# Patient Record
Sex: MALE | Race: WHITE | NOT HISPANIC OR LATINO | Employment: FULL TIME | URBAN - METROPOLITAN AREA
[De-identification: names, ages, dates, MRNs, and addresses within clinical notes are randomized per-mention and may not be internally consistent; named-entity substitution may affect disease eponyms.]

---

## 2017-04-05 ENCOUNTER — ALLSCRIPTS OFFICE VISIT (OUTPATIENT)
Dept: OTHER | Facility: OTHER | Age: 22
End: 2017-04-05

## 2017-04-26 ENCOUNTER — ALLSCRIPTS OFFICE VISIT (OUTPATIENT)
Dept: OTHER | Facility: OTHER | Age: 22
End: 2017-04-26

## 2017-04-26 LAB
FLUAV AG SPEC QL IA: NEGATIVE
INFLUENZA B AG (HISTORICAL): NEGATIVE

## 2017-09-20 ENCOUNTER — ALLSCRIPTS OFFICE VISIT (OUTPATIENT)
Dept: OTHER | Facility: OTHER | Age: 22
End: 2017-09-20

## 2017-09-21 ENCOUNTER — ALLSCRIPTS OFFICE VISIT (OUTPATIENT)
Dept: OTHER | Facility: OTHER | Age: 22
End: 2017-09-21

## 2017-10-11 ENCOUNTER — GENERIC CONVERSION - ENCOUNTER (OUTPATIENT)
Dept: OTHER | Facility: OTHER | Age: 22
End: 2017-10-11

## 2017-10-11 DIAGNOSIS — R53.82 CHRONIC FATIGUE: ICD-10-CM

## 2017-10-11 DIAGNOSIS — Z79.899 OTHER LONG TERM (CURRENT) DRUG THERAPY: ICD-10-CM

## 2017-10-11 DIAGNOSIS — M25.50 PAIN IN JOINT: ICD-10-CM

## 2017-11-11 ENCOUNTER — ALLSCRIPTS OFFICE VISIT (OUTPATIENT)
Dept: OTHER | Facility: OTHER | Age: 22
End: 2017-11-11

## 2017-11-12 NOTE — PROGRESS NOTES
Assessment    1  Acute laryngitis (464 00) (J04 0)    Plan  Acute laryngitis    · Amoxicillin 500 MG Oral Tablet; TAKE 1 TABLET 3 TIMES DAILY    Discussion/Summary    Rto prn  Possible side effects of new medications were reviewed with the patient/guardian today  The treatment plan was reviewed with the patient/guardian  The patient/guardian understands and agrees with the treatment plan      Chief Complaint  patient presenting c/o cough , fever , sinus pain , lost voice 3 days sl/cma      History of Present Illness  HPI: 19 y o m seen for above symptoms x 3 days, lost his voice, was given Motrin this am for fever, has a cough, no SOB      Review of Systems   Constitutional: fever-- and-- feeling poorly, but-- no chills-- and-- not feeling tired  ENT: sore throat,-- nasal discharge-- and-- hoarseness, but-- no earache  Respiratory: cough, but-- no shortness of breath,-- no wheezing-- and-- no shortness of breath during exertion  Gastrointestinal: no complaints of abdominal pain, no constipation, no nausea or vomiting, no diarrhea or bloody stools  Social History   · Denied: History of Alcohol Use (History)   · Caffeine Use   · Daily Coffee Consumption (1  Cups/Day)   · Never A Smoker    Current Meds    The medication list was reviewed and updated today  Allergies  1  No Known Drug Allergies    Vitals   Recorded: 04UPC2201 10:28AM   Temperature 97 2 F   Heart Rate 72   Respiration 16   Systolic 600   Diastolic 70   Height 5 ft 7 in   Weight 142 lb    BMI Calculated 22 24   BSA Calculated 1 75       Physical Exam   Constitutional  General appearance: No acute distress, well appearing and well nourished  Ears, Nose, Mouth, and Throat  Otoscopic examination: Tympanic membrance translucent with normal light reflex  Canals patent without erythema  Oropharynx: Normal with no erythema, edema, exudate or lesions     Pulmonary  Respiratory effort: No increased work of breathing or signs of respiratory distress  Auscultation of lungs: Clear to auscultation, equal breath sounds bilaterally, no wheezes, no rales, no rhonci           Signatures   Electronically signed by : ELZBIETA Arias ; Nov 11 2017 11:20AM EST                       (Author)

## 2018-01-11 NOTE — MISCELLANEOUS
Message  Return to work or school:   Brigid Castañeda is under my professional care  He was seen in my office on 9/21/17       Excused 9/21/17  Veronica ROBERTO        Signatures   Electronically signed by : PARDEEP West; Sep 21 2017  3:00PM EST                       (Author)

## 2018-01-12 VITALS
HEIGHT: 67 IN | TEMPERATURE: 98.3 F | BODY MASS INDEX: 22.6 KG/M2 | SYSTOLIC BLOOD PRESSURE: 110 MMHG | HEART RATE: 76 BPM | WEIGHT: 144 LBS | DIASTOLIC BLOOD PRESSURE: 60 MMHG | RESPIRATION RATE: 14 BRPM

## 2018-01-12 VITALS
TEMPERATURE: 97.2 F | HEIGHT: 67 IN | DIASTOLIC BLOOD PRESSURE: 70 MMHG | SYSTOLIC BLOOD PRESSURE: 110 MMHG | WEIGHT: 142 LBS | RESPIRATION RATE: 16 BRPM | BODY MASS INDEX: 22.29 KG/M2 | HEART RATE: 72 BPM

## 2018-01-13 VITALS
HEIGHT: 67 IN | SYSTOLIC BLOOD PRESSURE: 112 MMHG | BODY MASS INDEX: 21.66 KG/M2 | HEART RATE: 80 BPM | TEMPERATURE: 99.1 F | DIASTOLIC BLOOD PRESSURE: 78 MMHG | WEIGHT: 138 LBS | RESPIRATION RATE: 12 BRPM

## 2018-01-13 NOTE — MISCELLANEOUS
Message  Return to work or school:   Jill Escalona is under my professional care  He was seen in my office on January 30   He is able to return to work on  Feb 1      Work excuse January 30          Signatures   Electronically signed by : ELZBIETA Ashley ; Jan 30 2016 11:51AM EST                       (Author)

## 2018-01-15 NOTE — PROGRESS NOTES
Assessment    1  Generalized anxiety disorder (300 02) (F41 1)   2  Acne (706 1) (L70 9)   3  Depression (311) (F32 9)    Plan  Acne    · Clindamycin Phosphate 1 % External Lotion (Cleocin-T); APPLY SPARINGLY AND  MASSAGE IN TWICE DAILY  Generalized anxiety disorder    · ClonazePAM 0 5 MG Oral Tablet; TAKE 1 TABLET EVERY 12 HOURS DAILY   · FLUoxetine HCl - 20 MG Oral Capsule; TAKE 1 CAPSULE DAILY    Discussion/Summary    Suggested patient call for counseling help  He is to contact his insurance company  Congested medication for immediate relief and long-term therapy  I asked patient to call me in approximately one month or return at that time note for work for today given  Mother present  The patient, patient's family was counseled regarding instructions for management, importance of compliance with treatment  Chief Complaint  Pt  c/o anxiety, f/u acne  ck/lpn      History of Present Illness  HPI: Source of anxiety is concern for unborn child, girlfriend now 7 months pregnant wants nothing to do with him  Some question whether or not he is the father       Anxiety:   Brigid Castañeda presents with complaints of gradual onset of constant episodes of severe anxiety  Episodes months ago  He is currently experiencing anxiety  His symptoms are probably caused by family event  Symptoms are not improved by isolation, medications, meditation, quiet, sleep and exercise  Symptoms are unchanged  Associated symptoms include difficulty concentrating, excessive worry, fatigue, insomnia, nervousness and sleep disruption  Review of Systems    Constitutional: feeling poorly and feeling tired  Cardiovascular: no chest pain  Respiratory: no complaints of shortness of breath, no wheezing or cough, no dyspnea on exertion, no orthopnea or PND  Gastrointestinal: no complaints of abdominal pain, no constipation, no nausea or vomiting, no diarrhea or bloody stools  Active Problems    1  Acne (706 1) (L70 9)   2   Asthma (493 90) (J45 909)   3  Depression (311) (F32 9)   4  Encounter for pre-employment health screening examination (V70 5) (Z02 1)   5  Need for prophylactic vaccination and inoculation against influenza (V04 81) (Z23)    Past Medical History    1  History of Acute laryngitis (464 00) (J04 0)   2  History of Acute maxillary sinusitis (461 0) (J01 00)   3  History of Acute upper respiratory infection (465 9) (J06 9)   4  History of Cough (786 2) (R05)   5  History of Exercise-induced bronchospasm (493 81) (J45 990)   6  History of acute bronchitis (V12 69) (Z87 09)   7  History of acute pharyngitis (V12 69) (Z87 09)   8  History of allergic rhinitis (V12 69) (Z87 09)   9  History of dehydration (V12 29) (Z86 39)   10  History of herpes simplex infection (V12 09) (Z86 19)   11  History of oral aphthous ulcers (V12 79) (Z87 19)   12  History of viral infection (V12 09) (Z86 19)   13  Need for prophylactic vaccination and inoculation against influenza (V04 81) (Z23)    Social History    · Denied: History of Alcohol Use (History)   · Caffeine Use   · Daily Coffee Consumption (1  Cups/Day)   · Never A Smoker    Current Meds   1  No Reported Medications  Requested for: 12HCD3854 Recorded    Allergies    1   No Known Drug Allergies    Vitals   Recorded: [id number] 11:26AM   Temperature 98 9 F   Heart Rate 78   Respiration 16   Systolic 827   Diastolic 70   Height 5 ft 7 in   Weight 144 lb    BMI Calculated 22 55   BSA Calculated 1 76     Signatures   Electronically signed by : ELZBIETA Avitia ; Jan 30 2016 12:07PM EST                       (Author)

## 2018-01-17 NOTE — PROGRESS NOTES
Chief Complaint  patient here for Tdap vaccine  Active Problems    1  Asthma (493 90) (J45 909)   2  Depression (311) (F32 9)   3  Generalized anxiety disorder (300 02) (F41 1)   4  Need for Tdap vaccination (V06 1) (Z23)   5  Viral infection (079 99) (B34 9)    Current Meds   1  No Reported Medications  Requested for: 26Apr2017 Recorded    Allergies    1   No Known Drug Allergies    Plan  Need for Tdap vaccination    · Tdap (Adacel)    Signatures   Electronically signed by : ELZBIETA Mckeon ; Sep 20 2017  3:49PM EST                       (Author)

## 2018-01-22 VITALS
HEIGHT: 67 IN | HEART RATE: 72 BPM | DIASTOLIC BLOOD PRESSURE: 70 MMHG | SYSTOLIC BLOOD PRESSURE: 102 MMHG | BODY MASS INDEX: 21.82 KG/M2 | WEIGHT: 139 LBS | RESPIRATION RATE: 14 BRPM | TEMPERATURE: 97 F

## 2018-03-08 ENCOUNTER — OFFICE VISIT (OUTPATIENT)
Dept: FAMILY MEDICINE CLINIC | Facility: CLINIC | Age: 23
End: 2018-03-08
Payer: COMMERCIAL

## 2018-03-08 VITALS
WEIGHT: 143 LBS | BODY MASS INDEX: 21.67 KG/M2 | TEMPERATURE: 99.8 F | RESPIRATION RATE: 16 BRPM | HEART RATE: 88 BPM | SYSTOLIC BLOOD PRESSURE: 120 MMHG | DIASTOLIC BLOOD PRESSURE: 80 MMHG | HEIGHT: 68 IN

## 2018-03-08 DIAGNOSIS — R68.89 FLU-LIKE SYMPTOMS: Primary | ICD-10-CM

## 2018-03-08 PROCEDURE — 99213 OFFICE O/P EST LOW 20 MIN: CPT | Performed by: NURSE PRACTITIONER

## 2018-03-08 RX ORDER — OSELTAMIVIR PHOSPHATE 75 MG/1
75 CAPSULE ORAL 2 TIMES DAILY
Qty: 10 CAPSULE | Refills: 0 | Status: SHIPPED | OUTPATIENT
Start: 2018-03-08 | End: 2018-03-13

## 2018-03-08 NOTE — PATIENT INSTRUCTIONS
You have been diagnosed with a viral illness  Antibiotics are not indicated at this time  Fluids, Rest  Supportive care for symptom management  Tamiflu 75mg twice daily for 5 days

## 2018-03-08 NOTE — LETTER
March 8, 2018     Patient: Jyothi Arenas   YOB: 1995   Date of Visit: 3/8/2018       To Whom it May Concern:    Jonna Ahmadi is under my professional care  He was seen in my office on 3/8/2018  He may return to work on 3/13/18  Unable to work from 3/8/18 through 3/12/18 for medical reasons  If you have any questions or concerns, please don't hesitate to call           Sincerely,          DAVE Pruitt        CC: No Recipients

## 2018-03-08 NOTE — PROGRESS NOTES
Assessment/Plan:   1  Flu-like symptoms    - oseltamivir (TAMIFLU) 75 mg capsule; Take 1 capsule (75 mg total) by mouth 2 (two) times a day for 5 days  Dispense: 10 capsule; Refill: 0     Fluids  Rest  Supportive management as explained  Subjective:     Patient ID: Eddie Feliciano is a 23 y o  male  Symptoms started yesterday  Fever temp max 101  Headache, dizziness, nasal congestion, nasal pressure, body aches, cough, nausea  No vomiting or diarrhea  Left ear pain  No sick contacts  Took mucinex  Did not have flu shot this year  Reviewed pmh, allergies, medications, social history  Generalized Body Aches   Associated symptoms include congestion, ear pain (left), headaches, a sore throat, fatigue, a fever, coughing and nausea  Pertinent negatives include no shortness of breath, wheezing, diarrhea, vomiting, neck pain or rash  Review of Systems   Constitutional: Positive for chills, fatigue and fever  Negative for diaphoresis  HENT: Positive for congestion, ear pain (left), sinus pain, sinus pressure and sore throat  Eyes: Negative for visual disturbance  Respiratory: Positive for cough  Negative for chest tightness, shortness of breath and wheezing  Gastrointestinal: Positive for nausea  Negative for diarrhea and vomiting  Genitourinary: Negative for dysuria, frequency and urgency  Musculoskeletal: Positive for myalgias  Negative for back pain and neck pain  Skin: Negative for rash  Neurological: Positive for dizziness, light-headedness and headaches  Negative for weakness  Hematological: Negative for adenopathy  Objective:     Physical Exam   Constitutional: He is oriented to person, place, and time  He appears well-developed and well-nourished  No distress  HENT:   Head: Normocephalic and atraumatic  Nose: Nose normal    Mouth/Throat: Oropharynx is clear and moist    Tms wnl b/l   Eyes: Conjunctivae are normal    Neck: Normal range of motion  Neck supple  Cardiovascular: Normal rate and regular rhythm  No murmur heard  Pulmonary/Chest: Effort normal and breath sounds normal  No respiratory distress  He has no wheezes  He has no rales  Abdominal: Soft  Bowel sounds are normal  He exhibits no distension  There is no tenderness  Musculoskeletal: He exhibits no edema  Lymphadenopathy:     He has no cervical adenopathy  Neurological: He is alert and oriented to person, place, and time  Skin: Skin is warm and dry  No rash noted  Psychiatric: He has a normal mood and affect   His behavior is normal  Thought content normal

## 2018-03-09 ENCOUNTER — TELEPHONE (OUTPATIENT)
Dept: FAMILY MEDICINE CLINIC | Facility: CLINIC | Age: 23
End: 2018-03-09

## 2018-03-09 NOTE — TELEPHONE ENCOUNTER
Please call pt and advise that he was diagnosed with a  Viral illness so antibiotic is not appropriate  Flu is a viral illness so the tx is symptom management and the use of Tamiflu will help to shorten the course  His exam showed no signs of bacterial infection and therefore antibiotics not helpful     Expect to see symptoms such as cough, chest congestion, headache, body aches etc and can be treated with such things as tylenol, ibuprofen, fluids, rest, otc cough medications, etc  Symptoms can last for 7-10 days

## 2018-03-09 NOTE — TELEPHONE ENCOUNTER
Lily Judi    Patient seen yesterday and was given tamiflu, he is having chest congestion, dry cough  Would like antibiotic called into [facility] [place]

## 2018-09-07 ENCOUNTER — OFFICE VISIT (OUTPATIENT)
Dept: FAMILY MEDICINE CLINIC | Facility: CLINIC | Age: 23
End: 2018-09-07
Payer: COMMERCIAL

## 2018-09-07 VITALS
RESPIRATION RATE: 16 BRPM | HEART RATE: 80 BPM | HEIGHT: 68 IN | BODY MASS INDEX: 21.98 KG/M2 | WEIGHT: 145 LBS | DIASTOLIC BLOOD PRESSURE: 80 MMHG | TEMPERATURE: 98.4 F | SYSTOLIC BLOOD PRESSURE: 120 MMHG

## 2018-09-07 DIAGNOSIS — R19.09 INGUINAL BULGE: ICD-10-CM

## 2018-09-07 DIAGNOSIS — R10.32 INGUINAL PAIN, LEFT: Primary | ICD-10-CM

## 2018-09-07 PROCEDURE — 3008F BODY MASS INDEX DOCD: CPT | Performed by: NURSE PRACTITIONER

## 2018-09-07 PROCEDURE — 99214 OFFICE O/P EST MOD 30 MIN: CPT | Performed by: NURSE PRACTITIONER

## 2018-09-07 NOTE — PROGRESS NOTES
Assessment/Plan:  1  Inguinal pain, left  Will check ultrasound and refer to general surgery for eval  - US groin/inguinal area; Future  - Ambulatory referral to General Surgery; Future    2  Inguinal bulge  Will check ultrasound and refer to general surgery for eval  - US groin/inguinal area; Future  - Ambulatory referral to General Surgery; Future             Subjective:      Patient ID: Juanita Diego is a 23 y o  male who presents for possible hernia    Here for possible hernia  Started having symptoms about one year ago  Pain left lower abd/groin and left testicle  Intermittent  Sometimes thinks feels lump  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  No n/v/d  Feels like it is getting worse past few weeks  The following portions of the patient's history were reviewed and updated as appropriate: allergies, current medications, past family history, past medical history, past social history, past surgical history and problem list     Review of Systems   Constitutional: Negative for fever  Gastrointestinal: Positive for abdominal pain (intermittent left lower to groin pain)  Negative for constipation, diarrhea, nausea and vomiting  Genitourinary: Positive for testicular pain (intermittent left )  Negative for difficulty urinating, dysuria, hematuria and penile pain  Skin: Negative for rash and wound  Hematological: Negative for adenopathy  Objective:      /80 (BP Location: Left arm, Patient Position: Sitting, Cuff Size: Standard)   Pulse 80   Temp 98 4 °F (36 9 °C) (Tympanic)   Resp 16   Ht 5' 8" (1 727 m)   Wt 65 8 kg (145 lb)   BMI 22 05 kg/m²          Physical Exam   Constitutional: He appears well-developed and well-nourished  No distress  Cardiovascular: Normal rate, regular rhythm and normal heart sounds  No murmur heard  Abdominal: Bowel sounds are normal  He exhibits mass (slight left groin bulge, non tender  )  He exhibits no distension   There is no tenderness  Genitourinary:   Genitourinary Comments: No scrotal pain with palpation  No scrotal/testicular masses palpable  Skin: Skin is warm and dry  No rash noted  No erythema  Psychiatric: He has a normal mood and affect  His behavior is normal  Judgment and thought content normal    Vitals reviewed

## 2018-10-05 ENCOUNTER — OFFICE VISIT (OUTPATIENT)
Dept: FAMILY MEDICINE CLINIC | Facility: CLINIC | Age: 23
End: 2018-10-05
Payer: COMMERCIAL

## 2018-10-05 VITALS
TEMPERATURE: 99 F | BODY MASS INDEX: 21.86 KG/M2 | HEART RATE: 76 BPM | RESPIRATION RATE: 14 BRPM | WEIGHT: 144.2 LBS | OXYGEN SATURATION: 99 % | HEIGHT: 68 IN | DIASTOLIC BLOOD PRESSURE: 70 MMHG | SYSTOLIC BLOOD PRESSURE: 108 MMHG

## 2018-10-05 DIAGNOSIS — J06.9 ACUTE UPPER RESPIRATORY INFECTION: Primary | ICD-10-CM

## 2018-10-05 PROBLEM — R53.82 CHRONIC FATIGUE: Status: ACTIVE | Noted: 2017-10-11

## 2018-10-05 PROBLEM — M25.50 ARTHRALGIA OF MULTIPLE SITES: Status: ACTIVE | Noted: 2017-10-11

## 2018-10-05 LAB — S PYO AG THROAT QL: NEGATIVE

## 2018-10-05 PROCEDURE — 99213 OFFICE O/P EST LOW 20 MIN: CPT | Performed by: NURSE PRACTITIONER

## 2018-10-05 PROCEDURE — 87880 STREP A ASSAY W/OPTIC: CPT | Performed by: NURSE PRACTITIONER

## 2018-10-05 NOTE — PROGRESS NOTES
Chief Complaint   Patient presents with    Sinusitis     headache ear pain        Patient ID: Carlos Garcia is a 23 y o  male  New complaint onset yesterday patient notes ear pain that is worsening since it started as well as associated nasal congestion pain in the maxillary facial and frontal facial areas  He also notes associated mild sore throat  He notes an associated cough that is mild  Patient denies nighttime cough that is awakening him from sleep  Patient denies any symptoms of active asthma flare  He has not needed his rescue inhaler  He does report fever at home is temperature was a 100 3° this morning  Most recent assessment in the office does show a mild low-grade fever  Patient has treated this with DayQuil so far  He did notice some improvement  He states the symptoms return when the medicine wears off  Patient reports no known illness exposure  Denies any past history of mono  Past Medical History:   Diagnosis Date    Acne     Resolved 4/26/2017     Allergic rhinitis     Last assessed 3/20/2013     Dehydration     Onset date: 2/10/2012     Exercise induced bronchospasm     Onset date: 6/1/2009    Herpes simplex     Onset date: 2/11/2012     History of oral aphthous ulcers     Onset date: 2/12/2012     Nevus, atypical     Resolved 4/26/2017        History reviewed  No pertinent surgical history  Patient Active Problem List   Diagnosis    Flu-like symptoms    Asthma    Depression    Generalized anxiety disorder    Chronic fatigue    Arthralgia of multiple sites    Acute upper respiratory infection       History reviewed  No pertinent family history      Immunization History   Administered Date(s) Administered    DTaP 5 02/14/1994, 04/19/1994, 06/15/1994, 06/20/1995, 12/15/1998, 02/28/2007    H1N1, All Formulations 11/25/2009    Hep A, adult 02/28/2007    Hep B, adult 01/11/1994, 02/14/1994, 07/11/1994    Hib (HbOC) 02/14/1994, 04/19/1994, 06/15/1994, 06/20/1995    IPV 02/14/1994, 04/19/1994, 06/20/1995, 12/15/1998    Influenza TIV (IM) 12/17/2007, 09/08/2008, 09/30/2010, 10/12/2011, 11/08/2012, 10/15/2013    MMR 03/28/1995, 12/15/1998    Meningococcal Polysaccharide (MPSV4) 02/28/2007    Tdap 09/20/2017    Tuberculin Skin Test-PPD Intradermal 12/28/1994       No Known Allergies    No current outpatient prescriptions on file  No current facility-administered medications for this visit  Social History     Social History    Marital status: Single     Spouse name: N/A    Number of children: N/A    Years of education: N/A     Social History Main Topics    Smoking status: Never Smoker    Smokeless tobacco: Never Used    Alcohol use No    Drug use: Unknown    Sexual activity: Not Asked     Other Topics Concern    None     Social History Narrative    Caffeine use    Daily coffee consumption (1 cup/day)       Review of Systems   Constitutional: Negative  HENT: Negative  Eyes: Negative  Respiratory: Negative  Cardiovascular: Negative  Gastrointestinal: Negative  Skin: Negative  Objective:    /70 (BP Location: Left arm, Patient Position: Sitting, Cuff Size: Standard)   Pulse 76   Temp 99 °F (37 2 °C)   Resp 14   Ht 5' 8" (1 727 m)   Wt 65 4 kg (144 lb 3 2 oz)   SpO2 99%   BMI 21 93 kg/m²        Physical Exam   Constitutional: He appears well-developed and well-nourished  HENT:   Head: Normocephalic and atraumatic  Scant effusion at the TMs bilateral   Mild erythema  No visible exudate  No tonsillar enlargement  Eyes: Conjunctivae are normal    Neck: Neck supple  Cardiovascular: Normal rate, regular rhythm and normal heart sounds  Pulmonary/Chest: Effort normal and breath sounds normal    Abdominal: There is no tenderness  Lymphadenopathy:     He has cervical adenopathy  Assessment/Plan:    No problem-specific Assessment & Plan notes found for this encounter         Diagnoses and all orders for this visit:    Acute upper respiratory infection  Comments:  Rapid strep negative  Conservative over-the-counter therapy  Return to the office as necessary  Patient Instructions   For you can take Tylenol or ibuprofen for you're fever  Follow the package instructions  You can also continue to take the DayQuil over-the-counter  If you need to you can add NyQuil at bedtime  Continue to monitor your symptoms in if your feeling worse or you have a persistent fever over 101 come back  If you find her asthma flares in you need her rescue inhaler more than twice in a single day you need to be seen                      Maria L Helms

## 2018-10-05 NOTE — PATIENT INSTRUCTIONS
For you can take Tylenol or ibuprofen for you're fever  Follow the package instructions  You can also continue to take the DayQuil over-the-counter  If you need to you can add NyQuil at bedtime  Continue to monitor your symptoms in if your feeling worse or you have a persistent fever over 101 come back  If you find her asthma flares in you need her rescue inhaler more than twice in a single day you need to be seen

## 2018-11-12 ENCOUNTER — OFFICE VISIT (OUTPATIENT)
Dept: FAMILY MEDICINE CLINIC | Facility: CLINIC | Age: 23
End: 2018-11-12
Payer: COMMERCIAL

## 2018-11-12 VITALS
DIASTOLIC BLOOD PRESSURE: 80 MMHG | HEART RATE: 72 BPM | SYSTOLIC BLOOD PRESSURE: 120 MMHG | TEMPERATURE: 97.5 F | WEIGHT: 143 LBS | BODY MASS INDEX: 21.74 KG/M2 | RESPIRATION RATE: 12 BRPM

## 2018-11-12 DIAGNOSIS — J01.00 ACUTE NON-RECURRENT MAXILLARY SINUSITIS: Primary | ICD-10-CM

## 2018-11-12 PROCEDURE — 1036F TOBACCO NON-USER: CPT | Performed by: NURSE PRACTITIONER

## 2018-11-12 PROCEDURE — 99213 OFFICE O/P EST LOW 20 MIN: CPT | Performed by: NURSE PRACTITIONER

## 2018-11-12 RX ORDER — AMOXICILLIN AND CLAVULANATE POTASSIUM 875; 125 MG/1; MG/1
1 TABLET, FILM COATED ORAL EVERY 12 HOURS SCHEDULED
Qty: 14 TABLET | Refills: 0 | Status: SHIPPED | OUTPATIENT
Start: 2018-11-12 | End: 2018-11-19

## 2018-11-12 NOTE — PROGRESS NOTES
Assessment:      Acute bacterial sinusitis      Plan:        1  Augmentin  2  Nasal saline rinses as needed for congestion  3  Follow-up with PCP in 1 week if symptoms worsen or persist     4  Supportive care as discussed  Subjective:       Yury Moe is a 23 y o  male who presents for evaluation of possible sinus infection  Symptoms include right ear pressure/pain, congestion, productive cough with  green colored sputum, purulent nasal discharge and sinus pressure with no fever, chills, night sweats or weight loss  He denies dizziness  Onset of symptoms was 5 days ago, gradually worsening since that time  He is drinking plenty of fluids  Past history is significant for asthma  Patient is a non-smoker  He has been taking Dayquil w/ minimal relief  Sick contact: daughter who had strep throat 2 weeks ago  The following portions of the patient's history were reviewed and updated as appropriate: allergies, current medications, past family history, past medical history, past social history, past surgical history and problem list     Review of Systems  Pertinent items are noted in HPI        Objective:      /80 (BP Location: Left arm, Patient Position: Sitting, Cuff Size: Standard)   Pulse 72   Temp 97 5 °F (36 4 °C) (Temporal)   Resp 12   Wt 64 9 kg (143 lb)   BMI 21 74 kg/m²   General appearance: alert and oriented, in no acute distress  Ears: normal TM and external ear canal left ear and abnormal TM right ear - serous middle ear fluid  Nose: mild congestion, sinus tenderness bilateral (maxillary)  Throat: abnormal findings: mild oropharyngeal erythema, no exudate present  Lungs: clear to auscultation bilaterally  Heart: regular rate and rhythm, S1, S2 normal, no murmur, click, rub or gallop  Lymph nodes: Cervical adenopathy: negative

## 2018-11-12 NOTE — PATIENT INSTRUCTIONS
Sinusitis   AMBULATORY CARE:   Sinusitis  is inflammation or infection of your sinuses  It is most often caused by a virus  Acute sinusitis may last up to 12 weeks  Chronic sinusitis lasts longer than 12 weeks  Recurrent sinusitis means you have 4 or more times in 1 year  Common symptoms include the following:   · Fever    · Pain, pressure, redness, or swelling around the forehead, cheeks, or eyes    · Thick yellow or green discharge from your nose    · Tenderness when you touch your face over your sinuses    · Dry cough that happens mostly at night or when you lie down    · Headache and face pain that is worse when you lean forward    · Tooth pain, or pain when you chew  Seek care immediately if:   · Your eye and eyelid are red, swollen, and painful  · You cannot open your eye  · You have vision changes, such as double vision  · Your eyeball bulges out or you cannot move your eye  · You are more sleepy than normal, or you notice changes in your ability to think, move, or talk  · You have a stiff neck, a fever, or a bad headache  · You have swelling of your forehead or scalp  Contact your healthcare provider if:   · Your symptoms do not improve after 3 days  · Your symptoms do not go away after 10 days  · You have nausea and are vomiting  · Your nose is bleeding  · You have questions or concerns about your condition or care  Treatment for sinusitis:  Your symptoms may go away on their own  Your healthcare provider may recommend watchful waiting for up to 10 days before starting antibiotics  You may  need any of the following:  · Acetaminophen  decreases pain and fever  It is available without a doctor's order  Ask how much to take and how often to take it  Follow directions  Read the labels of all other medicines you are using to see if they also contain acetaminophen, or ask your doctor or pharmacist  Acetaminophen can cause liver damage if not taken correctly   Do not use more than 4 grams (4,000 milligrams) total of acetaminophen in one day  · NSAIDs , such as ibuprofen, help decrease swelling, pain, and fever  This medicine is available with or without a doctor's order  NSAIDs can cause stomach bleeding or kidney problems in certain people  If you take blood thinner medicine, always ask your healthcare provider if NSAIDs are safe for you  Always read the medicine label and follow directions  · Nasal steroid sprays  may help decrease inflammation in your nose and sinuses  · Decongestants  help reduce swelling and drain mucus in the nose and sinuses  They may help you breathe easier  · Antihistamines  help dry mucus in the nose and relieve sneezing  · Antibiotics  help treat or prevent a bacterial infection  · Take your medicine as directed  Contact your healthcare provider if you think your medicine is not helping or if you have side effects  Tell him or her if you are allergic to any medicine  Keep a list of the medicines, vitamins, and herbs you take  Include the amounts, and when and why you take them  Bring the list or the pill bottles to follow-up visits  Carry your medicine list with you in case of an emergency  Self-care:   · Rinse your sinuses  Use a sinus rinse device to rinse your nasal passages with a saline (salt water) solution or distilled water  Do not use tap water  This will help thin the mucus in your nose and rinse away pollen and dirt  It will also help reduce swelling so you can breathe normally  Ask your healthcare provider how often to do this  · Breathe in steam   Heat a bowl of water until you see steam  Lean over the bowl and make a tent over your head with a large towel  Breathe deeply for about 20 minutes  Be careful not to get too close to the steam or burn yourself  Do this 3 times a day  You can also breathe deeply when you take a hot shower  · Sleep with your head elevated    Place an extra pillow under your head before you go to sleep to help your sinuses drain  · Drink liquids as directed  Ask your healthcare provider how much liquid to drink each day and which liquids are best for you  Liquids will thin the mucus in your nose and help it drain  Avoid drinks that contain alcohol or caffeine  · Do not smoke, and avoid secondhand smoke  Nicotine and other chemicals in cigarettes and cigars can make your symptoms worse  Ask your healthcare provider for information if you currently smoke and need help to quit  E-cigarettes or smokeless tobacco still contain nicotine  Talk to your healthcare provider before you use these products  Prevent the spread of germs that cause sinusitis:  Wash your hands often with soap and water  Wash your hands after you use the bathroom, change a child's diaper, or sneeze  Wash your hands before you prepare or eat food  Follow up with your healthcare provider as directed: You may be referred to an ear, nose, and throat specialist  Write down your questions so you remember to ask them during your visits  © 2017 [facility] Information is for End User's use only and may not be sold, redistributed or otherwise used for commercial purposes  All illustrations and images included in CareNotes® are the copyrighted property of [employer] , [employer]  or Yogesh Simmons  The above information is an  only  It is not intended as medical advice for individual conditions or treatments  Talk to your doctor, nurse or pharmacist before following any medical regimen to see if it is safe and effective for you

## 2019-05-17 ENCOUNTER — OFFICE VISIT (OUTPATIENT)
Dept: URGENT CARE | Facility: CLINIC | Age: 24
End: 2019-05-17
Payer: COMMERCIAL

## 2019-05-17 DIAGNOSIS — J11.1 INFLUENZA: Primary | ICD-10-CM

## 2019-05-17 PROCEDURE — 99213 OFFICE O/P EST LOW 20 MIN: CPT | Performed by: PHYSICIAN ASSISTANT

## 2019-05-19 ENCOUNTER — OFFICE VISIT (OUTPATIENT)
Dept: URGENT CARE | Facility: CLINIC | Age: 24
End: 2019-05-19
Payer: COMMERCIAL

## 2019-05-19 VITALS
OXYGEN SATURATION: 99 % | TEMPERATURE: 100.9 F | HEIGHT: 68 IN | RESPIRATION RATE: 18 BRPM | HEART RATE: 100 BPM | SYSTOLIC BLOOD PRESSURE: 130 MMHG | DIASTOLIC BLOOD PRESSURE: 78 MMHG | BODY MASS INDEX: 21.67 KG/M2 | WEIGHT: 143 LBS

## 2019-05-19 DIAGNOSIS — R68.89 FLU-LIKE SYMPTOMS: ICD-10-CM

## 2019-05-19 DIAGNOSIS — H65.02 NON-RECURRENT ACUTE SEROUS OTITIS MEDIA OF LEFT EAR: Primary | ICD-10-CM

## 2019-05-19 PROCEDURE — 99213 OFFICE O/P EST LOW 20 MIN: CPT | Performed by: PHYSICIAN ASSISTANT

## 2019-05-19 RX ORDER — AZITHROMYCIN 250 MG/1
TABLET, FILM COATED ORAL
Qty: 6 TABLET | Refills: 0 | Status: SHIPPED | COMMUNITY
Start: 2019-05-19 | End: 2019-05-23

## 2019-05-19 RX ORDER — OSELTAMIVIR PHOSPHATE 75 MG/1
CAPSULE ORAL 2 TIMES DAILY
Refills: 0 | COMMUNITY
Start: 2019-05-17 | End: 2020-05-28 | Stop reason: ALTCHOICE

## 2019-05-19 RX ORDER — ALBUTEROL SULFATE 90 UG/1
2 AEROSOL, METERED RESPIRATORY (INHALATION) EVERY 4 HOURS PRN
COMMUNITY
End: 2020-03-28

## 2019-11-09 ENCOUNTER — OFFICE VISIT (OUTPATIENT)
Dept: URGENT CARE | Facility: CLINIC | Age: 24
End: 2019-11-09
Payer: COMMERCIAL

## 2019-11-09 VITALS
TEMPERATURE: 102.5 F | WEIGHT: 151.8 LBS | DIASTOLIC BLOOD PRESSURE: 76 MMHG | HEIGHT: 68 IN | HEART RATE: 119 BPM | OXYGEN SATURATION: 99 % | BODY MASS INDEX: 23.01 KG/M2 | RESPIRATION RATE: 18 BRPM | SYSTOLIC BLOOD PRESSURE: 125 MMHG

## 2019-11-09 DIAGNOSIS — J02.0 STREP SORE THROAT: Primary | ICD-10-CM

## 2019-11-09 PROCEDURE — 99213 OFFICE O/P EST LOW 20 MIN: CPT | Performed by: NURSE PRACTITIONER

## 2019-11-09 PROCEDURE — 87880 STREP A ASSAY W/OPTIC: CPT | Performed by: NURSE PRACTITIONER

## 2019-11-09 RX ORDER — AMOXICILLIN 875 MG/1
875 TABLET, COATED ORAL 2 TIMES DAILY
Qty: 14 TABLET | Refills: 0 | Status: SHIPPED | OUTPATIENT
Start: 2019-11-09 | End: 2019-11-16

## 2019-11-09 NOTE — LETTER
November 9, 2019     Patient: Kiley Morillo   YOB: 1995   Date of Visit: 11/9/2019       To Whom It May Concern: It is my medical opinion that Chon Rodriguez may return to work on 11/12/2019  If you have any questions or concerns, please don't hesitate to call           Sincerely,        DAVE Gerber    CC: No Recipients

## 2019-11-11 LAB — S PYO AG THROAT QL: POSITIVE

## 2019-11-12 NOTE — PROGRESS NOTES
[facility]        NAME: Apoorva Dickson is a 20 y o  male  : 1995    MRN: [id number]  DATE: 2019  TIME: 3:21 pm    Assessment and Plan   Strep sore throat [J02 0]  1  Strep sore throat  POCT rapid strepA    amoxicillin (AMOXIL) 875 mg tablet     Patient presents with flu-like symptoms  Due to the recent diagnosis of strep throat to his girl friend he was swabbed to rule out/in the diagnosis  He was strep positive  Patient Instructions     Take antibiotic as prescribed  Take with food  Tylenol/Ibuprofen as needed  Stay well hydrated  Follow up with PCP in 3-5 days  Proceed to  ER if symptoms worsen  Chief Complaint     Chief Complaint   Patient presents with    Influenza     flu like symptoms, body aches,earaches, N&V temp 102  since yesterday did not have a flu vaccine         History of Present Illness       21 y/o male presents for flu like symptoms  He c/o fever, nausea, sore throat, chills, body aches, and HA  He has been taking tylenol and ibuprofen with some relief  He denies any CP, palpitations, SOB or difficulty breathing  He did not get his flu shot  His girlfriend was recently diagnosed with strep throat  Review of Systems   Review of Systems   Constitutional: Positive for chills, fatigue and fever  HENT: Positive for sore throat  Negative for congestion, rhinorrhea and sinus pressure  Respiratory: Negative for cough, shortness of breath and wheezing  Cardiovascular: Negative for chest pain  Gastrointestinal: Positive for nausea  Negative for vomiting  Musculoskeletal: Positive for myalgias  Neurological: Positive for headaches           Current Medications       Current Outpatient Medications:     albuterol (PROVENTIL HFA,VENTOLIN HFA) 90 mcg/act inhaler, Inhale 2 puffs every 4 (four) hours as needed for wheezing, Disp: , Rfl:     amoxicillin (AMOXIL) 875 mg tablet, Take 1 tablet (875 mg total) by mouth 2 (two) times a day for 7 days, Disp: 14 tablet, Rfl: 0    oseltamivir (TAMIFLU) 75 mg capsule, 2 (two) times a day, Disp: , Rfl: 0    Current Allergies     Allergies as of 11/09/2019    (No Known Allergies)            The following portions of the patient's history were reviewed and updated as appropriate: allergies, current medications, past family history, past medical history, past social history, past surgical history and problem list      Past Medical History:   Diagnosis Date    Acne     Resolved 4/26/2017     Allergic rhinitis     Last assessed 3/20/2013     Asthma     Dehydration     Onset date: 2/10/2012     Exercise induced bronchospasm     Onset date: 6/1/2009    Herpes simplex     Onset date: 2/11/2012     History of oral aphthous ulcers     Onset date: 2/12/2012     Nevus, atypical     Resolved 4/26/2017        Past Surgical History:   Procedure Laterality Date    MYRINGOTOMY W/ TUBES      UMBILICAL HERNIA REPAIR         Family History   Problem Relation Age of Onset    No Known Problems Mother     No Known Problems Father          Medications have been verified  Objective   /76   Pulse (!) 119   Temp (!) 102 5 °F (39 2 °C)   Resp 18   Ht 5' 8" (1 727 m)   Wt 68 9 kg (151 lb 12 8 oz)   SpO2 99%   BMI 23 08 kg/m²        Physical Exam     Physical Exam   Constitutional: He is oriented to person, place, and time  He appears well-developed and well-nourished  He appears ill  He appears distressed  HENT:   Right Ear: Tympanic membrane and ear canal normal    Left Ear: Tympanic membrane and ear canal normal    Mouth/Throat: Posterior oropharyngeal erythema present  Tonsils are 2+ on the right  Tonsils are 2+ on the left  No tonsillar exudate  Cardiovascular: Regular rhythm  Tachycardia present  Pulmonary/Chest: Effort normal and breath sounds normal    Musculoskeletal: Normal range of motion  Lymphadenopathy:        Head (right side): Tonsillar adenopathy present  Head (left side):  Tonsillar adenopathy present  Neurological: He is alert and oriented to person, place, and time  He has normal strength  GCS eye subscore is 4  GCS verbal subscore is 5  GCS motor subscore is 6  Skin: Skin is warm and dry  There is pallor  Psychiatric: He has a normal mood and affect  His speech is normal    Nursing note and vitals reviewed

## 2020-03-01 ENCOUNTER — OFFICE VISIT (OUTPATIENT)
Dept: URGENT CARE | Facility: CLINIC | Age: 25
End: 2020-03-01
Payer: COMMERCIAL

## 2020-03-01 VITALS
BODY MASS INDEX: 22.43 KG/M2 | WEIGHT: 148 LBS | OXYGEN SATURATION: 98 % | SYSTOLIC BLOOD PRESSURE: 129 MMHG | HEIGHT: 68 IN | HEART RATE: 110 BPM | RESPIRATION RATE: 18 BRPM | TEMPERATURE: 100.8 F | DIASTOLIC BLOOD PRESSURE: 69 MMHG

## 2020-03-01 DIAGNOSIS — J11.1 INFLUENZA-LIKE ILLNESS: Primary | ICD-10-CM

## 2020-03-01 PROCEDURE — 99213 OFFICE O/P EST LOW 20 MIN: CPT | Performed by: PHYSICIAN ASSISTANT

## 2020-03-01 PROCEDURE — 1036F TOBACCO NON-USER: CPT | Performed by: PHYSICIAN ASSISTANT

## 2020-03-01 NOTE — PATIENT INSTRUCTIONS
Begin  Mucinex DM for cough and congestion  Nasal saline spray or Neti-pot to rinse the nasal passages  Tylenol or Motrin may be taken for fever and discomfort  Check your package labeling as some cold medications already contain fever reducers  Saltwater gargles, warm tea with honey, and throat lozenges may be relieving of throat discomfort  Use a cool mist humidifier at bedtime, turning on hours prior to bed with your bedroom doors shut for maximum relief  Follow up with your family doctor in 3-5 days  Proceed to the ER if symptoms worsen

## 2020-03-01 NOTE — PROGRESS NOTES
[facility]        NAME: Tacos Carney is a 30 y o  male  : 1995    MRN: [id number]  DATE: 2020  TIME: 1:22 PM    Assessment and Plan   Influenza-like illness [R69]  1  Influenza-like illness       Patient Instructions   Begin  Mucinex DM for cough and congestion  Nasal saline spray or Neti-pot to rinse the nasal passages  Tylenol or Motrin may be taken for fever and discomfort  Check your package labeling as some cold medications already contain fever reducers  Saltwater gargles, warm tea with honey, and throat lozenges may be relieving of throat discomfort  Use a cool mist humidifier at bedtime, turning on hours prior to bed with your bedroom doors shut for maximum relief  Follow up with your family doctor in 3-5 days  Proceed to the ER if symptoms worsen  Patient declined flu swab and Tamiflu  The diagnosis, etiology, expected course of illness, and treatment plan were reviewed  All questions answered  Precautions given  Patient verbalized understanding and agreement with the treatment plan  Chief Complaint     Chief Complaint   Patient presents with    Fever     Pt reports of fever with chills and body aches  History of Present Illness     57-year-old male presenting complaint fever x1 day  Reports symptoms chills, sweats, fatigue, body aches, and headache  Reports nasal congestion, bilateral ear pressure, cough, and diarrhea  Has had some chest tightness relieved with coughing and slight HARDWICK  No wheezing  Denies any sore throat, nausea or vomiting  No treatments tried  History of asthma and does have an albuterol inhaler at home  Has not used this  Family sick with similar sx  No recent travel  Nonsmoker  No flu shot  Review of Systems   Review of Systems   Constitutional: Positive for chills, diaphoresis, fatigue and fever  HENT: Positive for congestion and ear pain  Negative for rhinorrhea and sore throat      Respiratory: Positive for cough, chest tightness and shortness of breath  Negative for wheezing  Gastrointestinal: Positive for diarrhea  Negative for abdominal pain, nausea and vomiting  Musculoskeletal: Positive for myalgias  Skin: Negative for rash  Neurological: Positive for headaches  Current Medications       Current Outpatient Medications:     albuterol (PROVENTIL HFA,VENTOLIN HFA) 90 mcg/act inhaler, Inhale 2 puffs every 4 (four) hours as needed for wheezing, Disp: , Rfl:     oseltamivir (TAMIFLU) 75 mg capsule, 2 (two) times a day, Disp: , Rfl: 0    Current Allergies     Allergies as of 03/01/2020    (No Known Allergies)            The following portions of the patient's history were reviewed and updated as appropriate: allergies, current medications, past family history, past medical history, past social history, past surgical history and problem list      Past Medical History:   Diagnosis Date    Acne     Resolved 4/26/2017     Allergic rhinitis     Last assessed 3/20/2013     Asthma     Dehydration     Onset date: 2/10/2012     Exercise induced bronchospasm     Onset date: 6/1/2009    Herpes simplex     Onset date: 2/11/2012     History of oral aphthous ulcers     Onset date: 2/12/2012     Nevus, atypical     Resolved 4/26/2017        Past Surgical History:   Procedure Laterality Date    MYRINGOTOMY W/ TUBES      UMBILICAL HERNIA REPAIR         Family History   Problem Relation Age of Onset    No Known Problems Mother     No Known Problems Father      Medications have been verified  Objective   /69   Pulse (!) 110   Temp (!) 100 8 °F (38 2 °C)   Resp 18   Ht 5' 8" (1 727 m)   Wt 67 1 kg (148 lb)   SpO2 98%   BMI 22 50 kg/m²      Physical Exam     Physical Exam   Constitutional: Vital signs are normal  He appears well-developed and well-nourished  He is cooperative  He appears ill  No distress  HENT:   Head: Normocephalic and atraumatic     Right Ear: Hearing, tympanic membrane, external ear and ear canal normal    Left Ear: Hearing, tympanic membrane, external ear and ear canal normal    Mouth/Throat: Uvula is midline, oropharynx is clear and moist and mucous membranes are normal  Mucous membranes are not pale, not dry and not cyanotic  No oral lesions  No uvula swelling  No oropharyngeal exudate, posterior oropharyngeal edema, posterior oropharyngeal erythema or tonsillar abscesses  Nasal congestion and rhinorrhea  Eyes: Conjunctivae and lids are normal  Right eye exhibits no discharge and no exudate  Left eye exhibits no discharge and no exudate  Neck: Trachea normal and phonation normal  Neck supple  No tracheal tenderness present  No neck rigidity  No edema and no erythema present  Cardiovascular: Normal rate, regular rhythm and normal heart sounds  Exam reveals no distant heart sounds  Pulmonary/Chest: Effort normal and breath sounds normal  No stridor  No respiratory distress  He has no decreased breath sounds  He has no wheezes  He has no rhonchi  He has no rales  Abdominal: Soft  Bowel sounds are normal  He exhibits no distension and no mass  There is no tenderness  There is no rigidity, no rebound and no guarding  Lymphadenopathy:     He has no cervical adenopathy  Neurological: He is alert  He is not disoriented  No cranial nerve deficit  Coordination and gait normal    Skin: Skin is warm, dry and intact  No rash noted  He is not diaphoretic  No erythema  No pallor  Psychiatric: He has a normal mood and affect  His behavior is normal  Judgment and thought content normal    Nursing note and vitals reviewed

## 2020-03-01 NOTE — LETTER
March 1, 2020     Patient: Bianka Gross   YOB: 1995   Date of Visit: 3/1/2020       To Whom It May Concern: It is my medical opinion that Samantha Yanez may return to work on 3/3/2020  If you have any questions or concerns, please don't hesitate to call           Sincerely,        Katheryn Mckeon PA-C

## 2020-03-03 ENCOUNTER — TELEPHONE (OUTPATIENT)
Dept: FAMILY MEDICINE CLINIC | Facility: CLINIC | Age: 25
End: 2020-03-03

## 2020-03-03 NOTE — TELEPHONE ENCOUNTER
Left message requesting return call - patient was evaluated in the Urgent Care and diagnosed with influenza, was given tamiflu  Left message to triage further - unless patient has worsening sxs, no reason to be evaluated in the ER as he is being treated with the tamiflu - it will possibly 7 days for patient to fully recover - need to know if patients sxs are worsening or he was told to f/u with pcp by urgent care

## 2020-03-04 ENCOUNTER — OFFICE VISIT (OUTPATIENT)
Dept: URGENT CARE | Facility: CLINIC | Age: 25
End: 2020-03-04
Payer: COMMERCIAL

## 2020-03-04 VITALS
OXYGEN SATURATION: 98 % | DIASTOLIC BLOOD PRESSURE: 81 MMHG | HEIGHT: 68 IN | HEART RATE: 80 BPM | RESPIRATION RATE: 18 BRPM | BODY MASS INDEX: 22.22 KG/M2 | TEMPERATURE: 98.8 F | WEIGHT: 146.6 LBS | SYSTOLIC BLOOD PRESSURE: 123 MMHG

## 2020-03-04 DIAGNOSIS — J02.9 SORE THROAT: Primary | ICD-10-CM

## 2020-03-04 PROCEDURE — 3008F BODY MASS INDEX DOCD: CPT | Performed by: PREVENTIVE MEDICINE

## 2020-03-04 PROCEDURE — 1036F TOBACCO NON-USER: CPT | Performed by: PREVENTIVE MEDICINE

## 2020-03-04 PROCEDURE — 99213 OFFICE O/P EST LOW 20 MIN: CPT | Performed by: PREVENTIVE MEDICINE

## 2020-03-04 RX ORDER — AMOXICILLIN 500 MG/1
500 TABLET, FILM COATED ORAL 2 TIMES DAILY
Qty: 20 TABLET | Refills: 0 | Status: SHIPPED | OUTPATIENT
Start: 2020-03-04 | End: 2020-03-14

## 2020-03-04 NOTE — LETTER
March 4, 2020     Patient: Donna Escoto   YOB: 1995   Date of Visit: 3/4/2020       To Whom It May Concern: It is my medical opinion that Zara Velazquez may return to work on 3/6/2020  If you have any questions or concerns, please don't hesitate to call           Sincerely,        Mundo Walls MD    CC: No Recipients

## 2020-03-04 NOTE — PROGRESS NOTES
[facility]        NAME: Sri Schwartz is a 30 y o  male  : 1995    MRN: [id number]  DATE: 2020  TIME: 6:53 PM    Assessment and Plan   Sore throat [J02 9]  1  Sore throat  amoxicillin (AMOXIL) 500 MG tablet         Patient Instructions       Follow up with PCP in 3-5 days  Proceed to  ER if symptoms worsen  Chief Complaint     Chief Complaint   Patient presents with    Sore Throat     2 days ago head felt congestioned and throat and chest began to hurt  History of Present Illness       Sore Throat    This is a new problem  The current episode started in the past 7 days  The problem has been gradually worsening  The maximum temperature recorded prior to his arrival was 100 4 - 100 9 F  The fever has been present for less than 1 day  The pain is at a severity of 4/10  The pain is moderate  Associated symptoms include congestion, coughing, a hoarse voice and swollen glands  He has had exposure to strep  He has tried nothing for the symptoms  The treatment provided no relief  Review of Systems   Review of Systems   Constitutional: Negative  HENT: Positive for congestion, hoarse voice and sore throat  Eyes: Negative  Respiratory: Positive for cough  Cardiovascular: Negative  All other systems reviewed and are negative          Current Medications       Current Outpatient Medications:     albuterol (PROVENTIL HFA,VENTOLIN HFA) 90 mcg/act inhaler, Inhale 2 puffs every 4 (four) hours as needed for wheezing, Disp: , Rfl:     amoxicillin (AMOXIL) 500 MG tablet, Take 1 tablet (500 mg total) by mouth 2 (two) times a day for 10 days, Disp: 20 tablet, Rfl: 0    oseltamivir (TAMIFLU) 75 mg capsule, 2 (two) times a day, Disp: , Rfl: 0    Current Allergies     Allergies as of 2020    (No Known Allergies)            The following portions of the patient's history were reviewed and updated as appropriate: allergies, current medications, past family history, past medical history, past social history, past surgical history and problem list      Past Medical History:   Diagnosis Date    Acne     Resolved 4/26/2017     Allergic rhinitis     Last assessed 3/20/2013     Asthma     Dehydration     Onset date: 2/10/2012     Exercise induced bronchospasm     Onset date: 6/1/2009    Herpes simplex     Onset date: 2/11/2012     History of oral aphthous ulcers     Onset date: 2/12/2012     Nevus, atypical     Resolved 4/26/2017        Past Surgical History:   Procedure Laterality Date    MYRINGOTOMY W/ TUBES      UMBILICAL HERNIA REPAIR         Family History   Problem Relation Age of Onset    No Known Problems Mother     No Known Problems Father          Medications have been verified  Objective   /81   Pulse 80   Temp 98 8 °F (37 1 °C)   Resp 18   Ht 5' 8" (1 727 m)   Wt 66 5 kg (146 lb 9 6 oz)   SpO2 98%   BMI 22 29 kg/m²        Physical Exam     Physical Exam   Constitutional: He appears well-developed and well-nourished  HENT:   Mouth/Throat: Uvula is midline and mucous membranes are normal  Posterior oropharyngeal edema and posterior oropharyngeal erythema present  Tonsils are 0 on the right  Tonsils are 0 on the left  Tonsillar exudate  Eyes: Pupils are equal, round, and reactive to light  Cardiovascular: Normal rate and regular rhythm  Pulmonary/Chest: Effort normal and breath sounds normal    Abdominal: Soft  Nursing note and vitals reviewed

## 2020-03-04 NOTE — PATIENT INSTRUCTIONS
Sore Throat, Ambulatory Care   GENERAL INFORMATION:   A sore throat  is often caused by a cold or flu virus  A sore throat may also be caused by bacteria such as strep  Other causes include smoking, a runny nose, allergies, or acid reflux  Seek immediate care for the following symptoms:   · Trouble breathing or swallowing because your throat is swollen or sore    · Drooling because it hurts too much to swallow    · A painful lump in your throat that does not go away after 5 days    · A fever higher than 102? F (39?C) or lasts longer than 3 days    · Confusion    · Blood in your throat or ear  Treatment for a sore throat  will depend on the cause how severe it is  A sore throat cause by a virus will go away on its own without treatment  You will need antibiotics if your sore throat is caused by bacteria  Your sore throat should start to feel better within 3 to 5 days for both viral and bacterial infections  Care for your sore throat:   · Gargle with salt water  Mix ¼ teaspoon salt in a glass of warm water and gargle  This may help reduce swelling in your throat  · Take ibuprofen or acetaminophen:  These medicines decrease pain and fever  They are available without a doctor's order  Ask your healthcare provider which medicine is best for you  Ask how much to take and how often to take it  · Drink more liquids  Cold or warm drinks may help soothe your sore throat  Drinking liquids can also help prevent dehydration  · Use a cool-steam humidifier  to help moisten the air in your room and reduce your throat pain  · Use lozenges, ice, soft foods, or popsicles  to soothe your throat  · Rest your throat as much as possible  Try not to use your voice  This may irritate your throat and worsen your symptoms  Follow up with your healthcare provider as directed:  Write down your questions so you remember to ask them during your visits  CARE AGREEMENT:   You have the right to help plan your care   Learn about your health condition and how it may be treated  Discuss treatment options with your caregivers to decide what care you want to receive  You always have the right to refuse treatment  The above information is an  only  It is not intended as medical advice for individual conditions or treatments  Talk to your doctor, nurse or pharmacist before following any medical regimen to see if it is safe and effective for you  © 2014 [address] is for End User's use only and may not be sold, redistributed or otherwise used for commercial purposes  All illustrations and images included in CareNotes® are the copyrighted property of [employer]  or Yogesh Simmons

## 2020-03-20 ENCOUNTER — TELEPHONE (OUTPATIENT)
Dept: FAMILY MEDICINE CLINIC | Facility: CLINIC | Age: 25
End: 2020-03-20

## 2020-03-20 NOTE — TELEPHONE ENCOUNTER
Dr Ligia Rothman:    Patient is experiencing SOB, Cough and Congestion for 5 days  The last temperature was 99 7  Patient has not traveled internationally, no contact with anyone that tested positive for COVID-19, no airport travel  Please c/b to discuss further

## 2020-03-21 ENCOUNTER — OFFICE VISIT (OUTPATIENT)
Dept: FAMILY MEDICINE CLINIC | Facility: CLINIC | Age: 25
End: 2020-03-21
Payer: COMMERCIAL

## 2020-03-21 DIAGNOSIS — R09.89 CHEST CONGESTION: ICD-10-CM

## 2020-03-21 DIAGNOSIS — J45.909 ASTHMA, UNSPECIFIED ASTHMA SEVERITY, UNSPECIFIED WHETHER COMPLICATED, UNSPECIFIED WHETHER PERSISTENT: Primary | ICD-10-CM

## 2020-03-21 PROCEDURE — 99213 OFFICE O/P EST LOW 20 MIN: CPT | Performed by: FAMILY MEDICINE

## 2020-03-21 RX ORDER — ALBUTEROL SULFATE 90 UG/1
2 AEROSOL, METERED RESPIRATORY (INHALATION) EVERY 6 HOURS PRN
Qty: 1 INHALER | Refills: 0 | Status: SHIPPED | OUTPATIENT
Start: 2020-03-21 | End: 2020-03-28

## 2020-03-21 RX ORDER — AZITHROMYCIN 250 MG/1
TABLET, FILM COATED ORAL
Qty: 6 TABLET | Refills: 0 | Status: SHIPPED | OUTPATIENT
Start: 2020-03-21 | End: 2020-03-25

## 2020-03-21 RX ORDER — ALBUTEROL SULFATE 2.5 MG/3ML
2.5 SOLUTION RESPIRATORY (INHALATION) EVERY 6 HOURS PRN
Qty: 60 VIAL | Refills: 0 | Status: SHIPPED | OUTPATIENT
Start: 2020-03-21

## 2020-03-21 NOTE — PROGRESS NOTES
Virtual Brief Visit    Reason for visit is cough/congestion  +/-s/t  Chapincito Pearson Hx asthma  Encounter provider Shannon Hemphill MD    Provider located at [facility] [address] [address]      Recent Visits  Date Type Provider Dept   03/20/20 Telephone [facility]  recent visits within past 7 days and meeting all other requirements     Today's Visits  Date Type Provider Dept   03/21/20 Office Visit Shannon Hemphill  [address] today's visits and meeting all other requirements     Future Appointments  Date Type Provider Dept   03/21/20 Office Visit [facility] Hem[facility]ill MD [facility]   Showing future appointments within next 150 days and meeting all other requirements        Patient agrees to participate in a virtual check in via telephone or video visit instead of presenting to the office to address urgent/immediate medical needs  Patient is aware this is a billable service  After connecting through telephone, the patient was identified by name and date of birth  Homar Gerber was informed that this was a telemedicine visit and that the visit is being conducted through telephone which may not be secure and therefore might not be HIPAA-compliant  My office door was closed  No one else was in the room  He acknowledged consent and understanding of privacy and security of the virtual check-in visit  I informed the patient that I have reviewed his record in Epic and presented the opportunity for him to ask any questions regarding the visit today  The patient initiated communication and agreed to participate  Subjective   Homar Gerber is a 30 y o  male -hx asthma-intermittent sxs cough/congestion/s/t-no shortness of breath, +Lo- grade temp  No GI sxs, rash or cp  Sxs on/ff since end of February--reports exposed to girlfriend w similar sxs--pt seen at [address] [place] [address] on 2 occasions     Clinically dx flu-like illness and possible strep  Pt tx w Tamiflu and Amoxicillin  Felt better for awhile then sxs returned to lesser degree  Pt denies travel hx or known exposure to COVID-19  Past Medical History:   Diagnosis Date    Acne     Resolved 4/26/2017     Allergic rhinitis     Last assessed 3/20/2013     Asthma     Dehydration     Onset date: 2/10/2012     Exercise induced bronchospasm     Onset date: 6/1/2009    Herpes simplex     Onset date: 2/11/2012     History of oral aphthous ulcers     Onset date: 2/12/2012     Nevus, atypical     Resolved 4/26/2017        Past Surgical History:   Procedure Laterality Date    MYRINGOTOMY W/ TUBES      UMBILICAL HERNIA REPAIR         Current Outpatient Medications   Medication Sig Dispense Refill    albuterol (2 5 mg/3 mL) 0 083 % nebulizer solution Take 1 vial (2 5 mg total) by nebulization every 6 (six) hours as needed for wheezing or shortness of breath 60 vial 0    albuterol (PROVENTIL HFA,VENTOLIN HFA) 90 mcg/act inhaler Inhale 2 puffs every 4 (four) hours as needed for wheezing      albuterol (PROVENTIL HFA,VENTOLIN HFA) 90 mcg/act inhaler Inhale 2 puffs every 6 (six) hours as needed for wheezing or shortness of breath 1 Inhaler 0    azithromycin (ZITHROMAX) 250 mg tablet Take 2 tablets today then 1 tablet daily x 4 days 6 tablet 0    oseltamivir (TAMIFLU) 75 mg capsule 2 (two) times a day  0     No current facility-administered medications for this visit  No Known Allergies    Assessment  1  Asthma, unspecified asthma severity, unspecified whether complicated, unspecified whether persistent  Comments:  RX Albuterol inhaler as well as rx vials for neb use   Orders:  -     azithromycin (ZITHROMAX) 250 mg tablet; Take 2 tablets today then 1 tablet daily x 4 days  -     albuterol (PROVENTIL HFA,VENTOLIN HFA) 90 mcg/act inhaler;  Inhale 2 puffs every 6 (six) hours as needed for wheezing or shortness of breath  -     albuterol (2 5 mg/3 mL) 0 083 % nebulizer solution; Take 1 vial (2 5 mg total) by nebulization every 6 (six) hours as needed for wheezing or shortness of breath    2  Chest congestion  Comments:  rx Zpk, inh/neb txs as above  Call if sxs persist or worsen  Bryan telephone assessment is no audible wheezing or shortness of breath  Speaking full sentences w/o pause, no cough interruptions  Advised to tx as above-if no improvement in sxs or any worsening in condition pt to call back for further eval   Pt verbalized understanding  I spent 15  minutes with the patient during this virtual check-in visit

## 2020-03-27 ENCOUNTER — TELEPHONE (OUTPATIENT)
Dept: FAMILY MEDICINE CLINIC | Facility: CLINIC | Age: 25
End: 2020-03-27

## 2020-03-28 ENCOUNTER — TELEMEDICINE (OUTPATIENT)
Dept: FAMILY MEDICINE CLINIC | Facility: CLINIC | Age: 25
End: 2020-03-28
Payer: COMMERCIAL

## 2020-03-28 DIAGNOSIS — J45.21 MILD INTERMITTENT ASTHMA WITH ACUTE EXACERBATION: Primary | ICD-10-CM

## 2020-03-28 DIAGNOSIS — F41.9 ANXIETY: ICD-10-CM

## 2020-03-28 PROCEDURE — 99213 OFFICE O/P EST LOW 20 MIN: CPT | Performed by: FAMILY MEDICINE

## 2020-03-30 NOTE — PROGRESS NOTES
Virtual Regular Visit    Problem List Items Addressed This Visit        Respiratory    Asthma - Primary    Relevant Medications    fluticasone-salmeterol (Advair HFA) 45-21 MCG/ACT inhaler        Likely anxiety  Girlfriend listened to lung fields as she is a nurse and were clear  As per girlfriend, patient is anxious of what's going on  No symptoms of COIVD  Precautions reviewed  Reason for visit is  Chest tightness    Encounter provider Orestes Edmondson MD    Provider located at [address] [place] [phone]      Recent Visits  Date Type Provider Dept   03/28/20 C/ Matty Grigsby MD [facility]   03/27/20 Telephone Bailey Ash MD [facility]   Showing recent visits within past 7 days and meeting all other requirements     Future Appointments  No visits were found meeting these conditions  Showing future appointments within next 150 days and meeting all other requirements        The patient was identified by name and date of birth  Kassy Rangel was informed that this is a telemedicine visit and that the visit is being conducted through [address] [place] [address] and patient was informed that this is not a secure, HIPAA-complaint platform  he agrees to proceed     My office door was closed  No one else was in the room  He acknowledged consent and understanding of privacy and security of the video platform  The patient has agreed to participate and understands they can discontinue the visit at any time  Patient is aware this is a billable service  Subjective  Kassy Rangel is a 30 y o  male complains of tightness with deep breaths  Had asthma as a child not recently  Nose is stuffy  Symptoms started on Monday  No fevers, chills, SOB, cough  Patient just wanted to make sure he was okay  He was seen recently where he was treated for the flu, strep wihtin the month   Was seen by another provider who gave him an inhaler which he states did not help  Patient has some anxiety  Past Medical History:   Diagnosis Date    Acne     Resolved 4/26/2017     Allergic rhinitis     Last assessed 3/20/2013     Asthma     Dehydration     Onset date: 2/10/2012     Exercise induced bronchospasm     Onset date: 6/1/2009    Herpes simplex     Onset date: 2/11/2012     History of oral aphthous ulcers     Onset date: 2/12/2012     Nevus, atypical     Resolved 4/26/2017        Past Surgical History:   Procedure Laterality Date    MYRINGOTOMY W/ TUBES      UMBILICAL HERNIA REPAIR         Current Outpatient Medications   Medication Sig Dispense Refill    albuterol (2 5 mg/3 mL) 0 083 % nebulizer solution Take 1 vial (2 5 mg total) by nebulization every 6 (six) hours as needed for wheezing or shortness of breath 60 vial 0    fluticasone-salmeterol (Advair HFA) 45-21 MCG/ACT inhaler Inhale 2 puffs 2 (two) times a day Rinse mouth after use  1 Inhaler 0    oseltamivir (TAMIFLU) 75 mg capsule 2 (two) times a day  0     No current facility-administered medications for this visit  No Known Allergies    Review of Systems   Constitutional: Negative for fever  HENT: Positive for congestion  Negative for sore throat  Respiratory: Negative for cough and shortness of breath  Cardiovascular: Negative for chest pain and leg swelling  Gastrointestinal: Negative for abdominal pain, constipation, nausea and vomiting  Genitourinary: Negative for dysuria  Musculoskeletal: Negative for back pain  Neurological: Negative for dizziness, weakness, light-headedness and headaches  Psychiatric/Behavioral: Negative for agitation  Physical Exam   Constitutional: He is oriented to person, place, and time  He appears well-developed and well-nourished  No distress  HENT:   Head: Normocephalic and atraumatic  Eyes: Conjunctivae are normal    Pulmonary/Chest: Effort normal    Neurological: He is alert and oriented to person, place, and time  I spent 15 minutes with the patient during this visit

## 2020-05-28 ENCOUNTER — OFFICE VISIT (OUTPATIENT)
Dept: FAMILY MEDICINE CLINIC | Facility: CLINIC | Age: 25
End: 2020-05-28
Payer: COMMERCIAL

## 2020-05-28 VITALS
TEMPERATURE: 98.6 F | DIASTOLIC BLOOD PRESSURE: 70 MMHG | RESPIRATION RATE: 12 BRPM | HEIGHT: 68 IN | WEIGHT: 146 LBS | SYSTOLIC BLOOD PRESSURE: 102 MMHG | BODY MASS INDEX: 22.13 KG/M2 | HEART RATE: 60 BPM

## 2020-05-28 DIAGNOSIS — L03.031 PARONYCHIA OF TOE OF RIGHT FOOT: Primary | ICD-10-CM

## 2020-05-28 PROCEDURE — 3008F BODY MASS INDEX DOCD: CPT | Performed by: FAMILY MEDICINE

## 2020-05-28 PROCEDURE — 99213 OFFICE O/P EST LOW 20 MIN: CPT | Performed by: FAMILY MEDICINE

## 2020-05-28 PROCEDURE — 1036F TOBACCO NON-USER: CPT | Performed by: FAMILY MEDICINE

## 2020-05-28 RX ORDER — CEPHALEXIN 500 MG/1
500 CAPSULE ORAL EVERY 12 HOURS SCHEDULED
Qty: 10 CAPSULE | Refills: 0 | Status: SHIPPED | OUTPATIENT
Start: 2020-05-28 | End: 2020-06-02

## 2020-07-08 ENCOUNTER — TELEMEDICINE (OUTPATIENT)
Dept: FAMILY MEDICINE CLINIC | Facility: CLINIC | Age: 25
End: 2020-07-08
Payer: COMMERCIAL

## 2020-07-08 DIAGNOSIS — J01.90 ACUTE SINUSITIS, RECURRENCE NOT SPECIFIED, UNSPECIFIED LOCATION: Primary | ICD-10-CM

## 2020-07-08 PROCEDURE — 99213 OFFICE O/P EST LOW 20 MIN: CPT | Performed by: FAMILY MEDICINE

## 2020-07-08 PROCEDURE — 1036F TOBACCO NON-USER: CPT | Performed by: FAMILY MEDICINE

## 2020-07-08 RX ORDER — AZITHROMYCIN 250 MG/1
TABLET, FILM COATED ORAL
Qty: 6 TABLET | Refills: 0 | Status: SHIPPED | OUTPATIENT
Start: 2020-07-08 | End: 2020-07-13

## 2020-07-08 NOTE — PROGRESS NOTES
Virtual Regular Visit      Assessment/Plan:    Acute sinusitis, recurrence not specified, unspecified location  -     azithromycin (Zithromax) 250 mg tablet; Take 2 tablets (500 mg total) by mouth daily for 1 day, THEN 1 tablet (250 mg total) daily for 4 days  -     Counseled on supportive care including rest, hydration, nasal saline, Flonase, Mucinex  -     Return if sx worsen or fail to improve  Reason for visit is No chief complaint on file  Encounter provider Sachi Hernandez MD    Provider located at  [address] [address]      Recent Visits  No visits were found meeting these conditions  Showing recent visits within past 7 days and meeting all other requirements     Today's Visits  Date Type Provider Dept   07/08/20 Telemedicine Sachi Hernandez, [address] [place] [address] today's visits and meeting all other requirements     Future Appointments  No visits were found meeting these conditions  Showing future appointments within next 150 days and meeting all other requirements        The patient was identified by name and date of birth  Gutierrez Packer was informed that this is a telemedicine visit and that the visit is being conducted through [facility] and patient was informed that this is a secure, HIPAA-complaint platform  He agrees to proceed     My office door was closed  No one else was in the room  He acknowledged consent and understanding of privacy and security of the video platform  The patient has agreed to participate and understands they can discontinue the visit at any time  Patient is aware this is a billable service  Subjective  Gutierrez Packer is a 30 y o  male  HPI   Sinus Problem   This is a new problem  The current episode started yesterday  The problem has been gradually worsening since onset  There has been no fever  The pain is moderate   Associated symptoms include congestion, ear pain (right), headaches, a hoarse voice, sinus pressure, sneezing and a sore throat  Pertinent negatives include no chills, coughing, diaphoresis, neck pain, shortness of breath or swollen glands  Past treatments include acetaminophen  The treatment provided no relief       Past Medical History:   Diagnosis Date    Acne     Resolved 4/26/2017     Allergic rhinitis     Last assessed 3/20/2013     Asthma     Dehydration     Onset date: 2/10/2012     Exercise induced bronchospasm     Onset date: 6/1/2009    Herpes simplex     Onset date: 2/11/2012     History of oral aphthous ulcers     Onset date: 2/12/2012     Nevus, atypical     Resolved 4/26/2017        Past Surgical History:   Procedure Laterality Date    MYRINGOTOMY W/ TUBES      UMBILICAL HERNIA REPAIR         Current Outpatient Medications   Medication Sig Dispense Refill    albuterol (2 5 mg/3 mL) 0 083 % nebulizer solution Take 1 vial (2 5 mg total) by nebulization every 6 (six) hours as needed for wheezing or shortness of breath 60 vial 0    azithromycin (Zithromax) 250 mg tablet Take 2 tablets (500 mg total) by mouth daily for 1 day, THEN 1 tablet (250 mg total) daily for 4 days  6 tablet 0    fluticasone-salmeterol (Advair HFA) 45-21 MCG/ACT inhaler Inhale 2 puffs 2 (two) times a day Rinse mouth after use  1 Inhaler 0     No current facility-administered medications for this visit  No Known Allergies    Review of Systems   Constitutional: Negative for activity change, appetite change, chills, diaphoresis, fatigue, fever and unexpected weight change  HENT: Positive for congestion, ear pain, postnasal drip, rhinorrhea, sinus pressure, sinus pain, sneezing, sore throat and voice change  Eyes: Negative  Respiratory: Negative for cough, choking, chest tightness, shortness of breath and wheezing  Cardiovascular: Negative for chest pain and leg swelling     Gastrointestinal: Negative for abdominal distention, anal bleeding, blood in stool, constipation, diarrhea, nausea and vomiting  Genitourinary: Negative  Musculoskeletal: Negative for arthralgias, gait problem and myalgias  Skin: Negative for color change, pallor, rash and wound  Neurological: Positive for headaches  Negative for dizziness, tremors, syncope, weakness, light-headedness and numbness  Psychiatric/Behavioral: Negative  Video Exam    There were no vitals filed for this visit  Physical Exam   Constitutional: He is oriented to person, place, and time  He appears well-developed and well-nourished  No distress  HENT:   Head: Normocephalic and atraumatic  Eyes: Conjunctivae are normal  Right eye exhibits no discharge  Left eye exhibits no discharge  No scleral icterus  Pulmonary/Chest: Effort normal    Neurological: He is alert and oriented to person, place, and time  Skin: He is not diaphoretic  Psychiatric: He has a normal mood and affect  His behavior is normal  Judgment and thought content normal         I spent 15 minutes directly with the patient during this visit      VIRTUAL VISIT [address] acknowledges that he has consented to an online visit or consultation  He understands that the online visit is based solely on information provided by him, and that, in the absence of a face-to-face physical evaluation by the physician, the diagnosis he receives is both limited and provisional in terms of accuracy and completeness  This is not intended to replace a full medical face-to-face evaluation by the physician  Juliano Dumont understands and accepts these terms

## 2020-07-08 NOTE — PROGRESS NOTES
Assessment/Plan:       Diagnoses and all orders for this visit:    Acute sinusitis, recurrence not specified, unspecified location  -     azithromycin (Zithromax) 250 mg tablet; Take 2 tablets (500 mg total) by mouth daily for 1 day, THEN 1 tablet (250 mg total) daily for 4 days  - Counseled on supportive care including rest, hydration, nasal saline, Flonase, Mucinex  - Return if sx worsen or fail to improve  Subjective:      Patient ID: Itzel Crenshaw is a 30 y o  male  Sinus Problem   This is a new problem  The current episode started yesterday  The problem has been gradually worsening since onset  There has been no fever  The pain is moderate  Associated symptoms include congestion, ear pain (right), headaches, a hoarse voice, sinus pressure, sneezing and a sore throat  Pertinent negatives include no chills, coughing, diaphoresis, neck pain, shortness of breath or swollen glands  Past treatments include acetaminophen  The treatment provided no relief  The following portions of the patient's history were reviewed and updated as appropriate: allergies, current medications, past family history, past medical history, past social history, past surgical history and problem list     Review of Systems   Constitutional: Negative for activity change, appetite change, chills, diaphoresis, fatigue and fever  HENT: Positive for congestion, ear pain (right), hoarse voice, sinus pressure, sneezing and sore throat  Negative for postnasal drip and rhinorrhea  Eyes: Negative  Respiratory: Negative for cough, choking, chest tightness, shortness of breath and wheezing  Cardiovascular: Negative for chest pain and leg swelling  Gastrointestinal: Negative for abdominal distention, anal bleeding, blood in stool, constipation, diarrhea, nausea and vomiting  Genitourinary: Negative  Musculoskeletal: Negative for arthralgias, gait problem, myalgias and neck pain     Skin: Negative for color change, pallor, rash and wound  Neurological: Positive for headaches  Negative for dizziness, tremors, syncope, weakness, light-headedness and numbness  Psychiatric/Behavioral: Negative  Objective: There were no vitals taken for this visit  Physical Exam   Constitutional: He is oriented to person, place, and time  He appears well-developed and well-nourished  No distress  HENT:   Head: Normocephalic and atraumatic  Eyes: Conjunctivae and EOM are normal  Right eye exhibits no discharge  Left eye exhibits no discharge  Pulmonary/Chest: Effort normal    Neurological: He is alert and oriented to person, place, and time  Skin: He is not diaphoretic  Psychiatric: He has a normal mood and affect   His behavior is normal  Judgment and thought content normal

## 2021-03-31 ENCOUNTER — TELEMEDICINE (OUTPATIENT)
Dept: FAMILY MEDICINE CLINIC | Facility: CLINIC | Age: 26
End: 2021-03-31
Payer: COMMERCIAL

## 2021-03-31 DIAGNOSIS — R51.9 ACUTE NONINTRACTABLE HEADACHE, UNSPECIFIED HEADACHE TYPE: ICD-10-CM

## 2021-03-31 DIAGNOSIS — Z20.822 CLOSE EXPOSURE TO COVID-19 VIRUS: ICD-10-CM

## 2021-03-31 DIAGNOSIS — R51.9 ACUTE NONINTRACTABLE HEADACHE, UNSPECIFIED HEADACHE TYPE: Primary | ICD-10-CM

## 2021-03-31 PROCEDURE — U0003 INFECTIOUS AGENT DETECTION BY NUCLEIC ACID (DNA OR RNA); SEVERE ACUTE RESPIRATORY SYNDROME CORONAVIRUS 2 (SARS-COV-2) (CORONAVIRUS DISEASE [COVID-19]), AMPLIFIED PROBE TECHNIQUE, MAKING USE OF HIGH THROUGHPUT TECHNOLOGIES AS DESCRIBED BY CMS-2020-01-R: HCPCS | Performed by: FAMILY MEDICINE

## 2021-03-31 PROCEDURE — U0005 INFEC AGEN DETEC AMPLI PROBE: HCPCS | Performed by: FAMILY MEDICINE

## 2021-03-31 PROCEDURE — G2012 BRIEF CHECK IN BY MD/QHP: HCPCS | Performed by: FAMILY MEDICINE

## 2021-03-31 NOTE — PROGRESS NOTES
COVID-19 Outpatient Progress Note    Assessment/Plan:    Problem List Items Addressed This Visit     None      Visit Diagnoses     Acute nonintractable headache, unspecified headache type    -  Primary    Relevant Orders    Novel Coronavirus (Covid-19),PCR [facility] - Collected at [facility]    Close exposure to COVID-19 virus        Relevant Orders    Novel Coronavirus (Covid-19),PCR [facility] - Collected at [facility]         Disposition:     I recommended COVID-19 PCR testing on or after day 5 since last exposure and if negative can end quarantine after 7 days  Patient was instructed to watch for symptoms until 14 days after exposure  If patient were to develop symptoms, they should immediately self isolate and call our office for further guidance  I have spent 5 minutes directly with the patient  Encounter provider Michelle Marte MD    Provider located at [address] [place] [phone]    Recent Visits  No visits were found meeting these conditions  Showing recent visits within past 7 days and meeting all other requirements     Today's Visits  Date Type Provider Dept   03/31/21 Telemedicine Michelle Marte MD [address] [place] [address] today's visits and meeting all other requirements     Future Appointments  No visits were found meeting these conditions  Showing future appointments within next 150 days and meeting all other requirements          Subjective:   Jesse Schwarz is a 30 y o  male who is concerned about COVID-19  Patient's symptoms include headache  Patient denies fever, chills, fatigue, malaise, congestion, rhinorrhea, sore throat, anosmia, loss of taste, cough, shortness of breath, chest tightness, abdominal pain, nausea, vomiting, diarrhea and myalgias       Date of symptom onset: 3/27/2021  Date of exposure: 3/27/2021    Exposure:   Contact with a person who is under investigation (PUI) for or who is positive for COVID-19 within the last 14 days?: Yes    Hospitalized recently for fever and/or lower respiratory symptoms?: No      Currently a healthcare worker that is involved in direct patient care?: No      Works in a special setting where the risk of COVID-19 transmission may be high? (this may include long-term care, correctional and prison facilities; homeless shelters; assisted-living facilities and group homes ): No      Resident in a special setting where the risk of COVID-19 transmission may be high? (this may include long-term care, correctional and prison facilities; homeless shelters; assisted-living facilities and group homes ): No      No results found for: Santiagowilian Christophershruthi, [address], Demetria Alarcon  Past Medical History:   Diagnosis Date    Acne     Resolved 4/26/2017     Allergic rhinitis     Last assessed 3/20/2013     Asthma     Dehydration     Onset date: 2/10/2012     Exercise induced bronchospasm     Onset date: 6/1/2009    Herpes simplex     Onset date: 2/11/2012     History of oral aphthous ulcers     Onset date: 2/12/2012     Nevus, atypical     Resolved 4/26/2017      Past Surgical History:   Procedure Laterality Date    MYRINGOTOMY W/ TUBES      UMBILICAL HERNIA REPAIR       Current Outpatient Medications   Medication Sig Dispense Refill    albuterol (2 5 mg/3 mL) 0 083 % nebulizer solution Take 1 vial (2 5 mg total) by nebulization every 6 (six) hours as needed for wheezing or shortness of breath 60 vial 0    fluticasone-salmeterol (Advair HFA) 45-21 MCG/ACT inhaler Inhale 2 puffs 2 (two) times a day Rinse mouth after use  1 Inhaler 0     No current facility-administered medications for this visit  No Known Allergies    Review of Systems   Constitutional: Negative for chills, fatigue and fever  HENT: Negative for congestion, rhinorrhea and sore throat  Respiratory: Negative for cough, chest tightness and shortness of breath  Gastrointestinal: Negative for abdominal pain, diarrhea, nausea and vomiting  Musculoskeletal: Negative for myalgias  Neurological: Positive for headaches  Objective: There were no vitals filed for this visit  Physical Exam  VIRTUAL VISIT DISCLAIMER    Giulia Lawler acknowledges that he has consented to an online visit or consultation  He understands that the online visit is based solely on information provided by him, and that, in the absence of a face-to-face physical evaluation by the physician, the diagnosis he receives is both limited and provisional in terms of accuracy and completeness  This is not intended to replace a full medical face-to-face evaluation by the physician  Giulia Lawler understands and accepts these terms

## 2021-04-01 LAB — SARS-COV-2 RNA RESP QL NAA+PROBE: NEGATIVE

## 2021-04-02 ENCOUNTER — TELEPHONE (OUTPATIENT)
Dept: FAMILY MEDICINE CLINIC | Facility: CLINIC | Age: 26
End: 2021-04-02

## 2021-04-02 NOTE — TELEPHONE ENCOUNTER
----- Message from Carlota Woodruff MD sent at 4/2/2021  7:55 AM EDT -----  Pl, advise pt -  Negative COVID test

## 2021-05-20 ENCOUNTER — TELEMEDICINE (OUTPATIENT)
Dept: FAMILY MEDICINE CLINIC | Facility: CLINIC | Age: 26
End: 2021-05-20
Payer: COMMERCIAL

## 2021-05-20 VITALS — WEIGHT: 148 LBS | HEIGHT: 68 IN | TEMPERATURE: 101 F | BODY MASS INDEX: 22.43 KG/M2

## 2021-05-20 DIAGNOSIS — R19.7 DIARRHEA, UNSPECIFIED TYPE: ICD-10-CM

## 2021-05-20 DIAGNOSIS — R50.9 FEVER, UNSPECIFIED FEVER CAUSE: ICD-10-CM

## 2021-05-20 DIAGNOSIS — R11.2 NAUSEA AND VOMITING, INTRACTABILITY OF VOMITING NOT SPECIFIED, UNSPECIFIED VOMITING TYPE: Primary | ICD-10-CM

## 2021-05-20 PROCEDURE — 99213 OFFICE O/P EST LOW 20 MIN: CPT | Performed by: FAMILY MEDICINE

## 2021-05-20 PROCEDURE — 3008F BODY MASS INDEX DOCD: CPT | Performed by: FAMILY MEDICINE

## 2021-05-20 PROCEDURE — 1036F TOBACCO NON-USER: CPT | Performed by: FAMILY MEDICINE

## 2021-05-20 NOTE — PROGRESS NOTES
Virtual Regular Visit      Assessment/Plan:    Problem List Items Addressed This Visit     None      Visit Diagnoses     Nausea and vomiting, intractability of vomiting not specified, unspecified vomiting type    -  Primary    Relevant Orders    Novel Coronavirus (Covid-19),PCR [facility] - Collected at [facility]    Fever, unspecified fever cause        Relevant Orders    Novel Coronavirus (Covid-19),PCR [facility] - Collected at [facility]    Diarrhea, unspecified type        Relevant Orders    Novel Coronavirus (Covid-19),PCR [facility] - Collected at [facility]        Gastroenteritis versus COVID-19  Sent for COVID testing  Advised the patient not to go for testing today and wait till tomorrow morning so we have a full 24 hours of symptoms  If COVID is negative I will still have the patient quarantine until he is fever free for at least 3 days  Patient is to continue hydration  If he has nausea gets worse to please contact us and we can prescribe him Zofran  If symptoms do not improve would like the patient to see his primary care physician on Monday for follow-up evaluation  Reason for visit is   Chief Complaint   Patient presents with    Headache     PATIENT HAS HEADACHE, FEVER, NAUSEA, BODY ACHES AND FATIGUE, DIARRHEA SINCE LAST NIGHT    Fever    Fatigue    Nausea    Generalized Body Aches    Virtual Regular Visit        Encounter provider Nikia Sykes MD    Provider located at [address] [place] [id number]      Recent Visits  No visits were found meeting these conditions  Showing recent visits within past 7 days and meeting all other requirements     Today's Visits  Date Type Provider Dept   05/20/21 Telemedicine Nikia Sykes MD [facility] [phone] today's visits and meeting all other requirements     Future Appointments  No visits were found meeting these conditions     Showing future appointments within next 150 days and meeting all other requirements        The patient was identified by name and date of birth  Itzel Crenshaw was informed that this is a telemedicine visit and that the visit is being conducted through [facility] and patient was informed that this is a secure, HIPAA-compliant platform  He agrees to proceed     My office door was closed  No one else was in the room  He acknowledged consent and understanding of privacy and security of the video platform  The patient has agreed to participate and understands they can discontinue the visit at any time  Patient is aware this is a billable service  Subjective  Itzel Crenshaw is a 30 y o  male    yesterady at 7pm started with nausea, and body aches, and threw up  Had a bad headache, with fatigue  Had 101 fever  Diarrhea  Nose faint smell lost    No close contacts with COVID  Work Compunds  Past Medical History:   Diagnosis Date    Acne     Resolved 4/26/2017     Allergic rhinitis     Last assessed 3/20/2013     Asthma     Dehydration     Onset date: 2/10/2012     Exercise induced bronchospasm     Onset date: 6/1/2009    Herpes simplex     Onset date: 2/11/2012     History of oral aphthous ulcers     Onset date: 2/12/2012     Nevus, atypical     Resolved 4/26/2017        Past Surgical History:   Procedure Laterality Date    MYRINGOTOMY W/ TUBES      UMBILICAL HERNIA REPAIR         Current Outpatient Medications   Medication Sig Dispense Refill    albuterol (2 5 mg/3 mL) 0 083 % nebulizer solution Take 1 vial (2 5 mg total) by nebulization every 6 (six) hours as needed for wheezing or shortness of breath 60 vial 0    fluticasone-salmeterol (Advair HFA) 45-21 MCG/ACT inhaler Inhale 2 puffs 2 (two) times a day Rinse mouth after use  1 Inhaler 0     No current facility-administered medications for this visit           No Known Allergies    Review of Systems   Constitutional: Positive for appetite change, fatigue and fever  Negative for activity change and chills  HENT: Positive for congestion  Respiratory: Negative for cough, chest tightness and shortness of breath  Cardiovascular: Negative for chest pain and leg swelling  Gastrointestinal: Positive for abdominal pain, diarrhea and nausea  Negative for abdominal distention, constipation and vomiting  All other systems reviewed and are negative  Video Exam    Vitals:    05/20/21 1331   Temp: (!) 101 °F (38 3 °C)   TempSrc: Oral   Weight: 67 1 kg (148 lb)   Height: 5' 8" (1 727 m)       Physical Exam  Constitutional:       Appearance: Normal appearance  Pulmonary:      Effort: Pulmonary effort is normal    Musculoskeletal: Normal range of motion  Neurological:      General: No focal deficit present  Mental Status: He is alert and oriented to person, place, and time  Psychiatric:         Mood and Affect: Mood normal          Behavior: Behavior normal          Thought Content: Thought content normal          Judgment: Judgment normal           I spent 15  minutes directly with the patient during this visit      VIRTUAL VISIT [address] acknowledges that he has consented to an online visit or consultation  He understands that the online visit is based solely on information provided by him, and that, in the absence of a face-to-face physical evaluation by the physician, the diagnosis he receives is both limited and provisional in terms of accuracy and completeness  This is not intended to replace a full medical face-to-face evaluation by the physician  Carin Florentino understands and accepts these terms

## 2021-05-21 DIAGNOSIS — R50.9 FEVER, UNSPECIFIED FEVER CAUSE: ICD-10-CM

## 2021-05-21 DIAGNOSIS — R19.7 DIARRHEA, UNSPECIFIED TYPE: ICD-10-CM

## 2021-05-21 DIAGNOSIS — R11.2 NAUSEA AND VOMITING, INTRACTABILITY OF VOMITING NOT SPECIFIED, UNSPECIFIED VOMITING TYPE: ICD-10-CM

## 2021-05-21 PROCEDURE — U0003 INFECTIOUS AGENT DETECTION BY NUCLEIC ACID (DNA OR RNA); SEVERE ACUTE RESPIRATORY SYNDROME CORONAVIRUS 2 (SARS-COV-2) (CORONAVIRUS DISEASE [COVID-19]), AMPLIFIED PROBE TECHNIQUE, MAKING USE OF HIGH THROUGHPUT TECHNOLOGIES AS DESCRIBED BY CMS-2020-01-R: HCPCS | Performed by: FAMILY MEDICINE

## 2021-05-21 PROCEDURE — U0005 INFEC AGEN DETEC AMPLI PROBE: HCPCS | Performed by: FAMILY MEDICINE

## 2021-05-22 LAB — SARS-COV-2 RNA RESP QL NAA+PROBE: NEGATIVE

## 2021-05-24 ENCOUNTER — TELEPHONE (OUTPATIENT)
Dept: FAMILY MEDICINE CLINIC | Facility: CLINIC | Age: 26
End: 2021-05-24

## 2021-05-24 NOTE — TELEPHONE ENCOUNTER
----- Message from Nikia Sykes MD sent at 5/24/2021  7:55 AM EDT -----  Please advise patient that their COVID is negative  How are they feeling?

## 2021-05-24 NOTE — TELEPHONE ENCOUNTER
----- Message from Bennett Marquis MD sent at 5/24/2021  7:55 AM EDT -----  Please advise patient that their COVID is negative  How are they feeling?

## 2021-05-25 ENCOUNTER — TELEPHONE (OUTPATIENT)
Dept: FAMILY MEDICINE CLINIC | Facility: CLINIC | Age: 26
End: 2021-05-25

## 2021-05-25 NOTE — TELEPHONE ENCOUNTER
----- Message from Lionel Wolff MD sent at 5/24/2021  7:55 AM EDT -----  Please advise patient that their COVID is negative  How are they feeling?

## 2021-07-01 ENCOUNTER — TELEMEDICINE (OUTPATIENT)
Dept: FAMILY MEDICINE CLINIC | Facility: CLINIC | Age: 26
End: 2021-07-01
Payer: COMMERCIAL

## 2021-07-01 DIAGNOSIS — R09.81 NASAL SINUS CONGESTION: Primary | ICD-10-CM

## 2021-07-01 PROBLEM — R68.89 FLU-LIKE SYMPTOMS: Status: RESOLVED | Noted: 2018-03-08 | Resolved: 2021-07-01

## 2021-07-01 PROBLEM — H65.02 NON-RECURRENT ACUTE SEROUS OTITIS MEDIA OF LEFT EAR: Status: RESOLVED | Noted: 2019-05-19 | Resolved: 2021-07-01

## 2021-07-01 PROBLEM — J06.9 ACUTE UPPER RESPIRATORY INFECTION: Status: RESOLVED | Noted: 2018-10-05 | Resolved: 2021-07-01

## 2021-07-01 PROCEDURE — U0003 INFECTIOUS AGENT DETECTION BY NUCLEIC ACID (DNA OR RNA); SEVERE ACUTE RESPIRATORY SYNDROME CORONAVIRUS 2 (SARS-COV-2) (CORONAVIRUS DISEASE [COVID-19]), AMPLIFIED PROBE TECHNIQUE, MAKING USE OF HIGH THROUGHPUT TECHNOLOGIES AS DESCRIBED BY CMS-2020-01-R: HCPCS | Performed by: FAMILY MEDICINE

## 2021-07-01 PROCEDURE — U0005 INFEC AGEN DETEC AMPLI PROBE: HCPCS | Performed by: FAMILY MEDICINE

## 2021-07-01 PROCEDURE — 99213 OFFICE O/P EST LOW 20 MIN: CPT | Performed by: FAMILY MEDICINE

## 2021-07-01 NOTE — PROGRESS NOTES
Virtual Regular Visit      Assessment/Plan:    Problem List Items Addressed This Visit     None      Visit Diagnoses     Nasal sinus congestion    -  Primary    Relevant Orders    Novel Coronavirus (Covid-19),PCR [facility] - Collected at [facility]               Reason for visit is   Chief Complaint   Patient presents with    Virtual Regular Visit    Cough    Sinus Problem        Encounter provider Dalton Howard MD    Provider located at [address] [place] [phone]      Recent Visits  No visits were found meeting these conditions  Showing recent visits within past 7 days and meeting all other requirements  Today's Visits  Date Type Provider Dept   07/01/21 Telemedicine Dalton Howard  [facility] today's visits and meeting all other requirements  Future Appointments  No visits were found meeting these conditions  Showing future appointments within next 150 days and meeting all other requirements       The patient was identified by name and date of birth  Zana Hernandez was informed that this is a telemedicine visit and that the visit is being conducted through [address] [place] [address] Now and patient was informed that this is a secure, HIPAA-compliant platform  He agrees to proceed     My office door was closed  No one else was in the room  He acknowledged consent and understanding of privacy and security of the video platform  The patient has agreed to participate and understands they can discontinue the visit at any time  Patient is aware this is a billable service  Subjective  Zana Hernandez is a 30 y o  male    Cough  This is a new problem  The current episode started yesterday  The problem has been unchanged  The cough is productive of sputum  Associated symptoms include nasal congestion, rhinorrhea and a sore throat   Pertinent negatives include no chest pain, chills, ear congestion, ear pain, fever, headaches, heartburn, hemoptysis, myalgias, postnasal drip, rash, shortness of breath, sweats, weight loss or wheezing  Nothing aggravates the symptoms  He has tried nothing for the symptoms  The treatment provided no relief  There is no history of asthma, bronchiectasis, bronchitis, COPD, emphysema, environmental allergies or pneumonia  Sinus Problem  Associated symptoms include congestion, coughing, sinus pressure and a sore throat  Pertinent negatives include no chills, ear pain, headaches or shortness of breath  Past Medical History:   Diagnosis Date    Acne     Resolved 4/26/2017     Allergic rhinitis     Last assessed 3/20/2013     Asthma     Dehydration     Onset date: 2/10/2012     Exercise induced bronchospasm     Onset date: 6/1/2009    Herpes simplex     Onset date: 2/11/2012     History of oral aphthous ulcers     Onset date: 2/12/2012     Nevus, atypical     Resolved 4/26/2017        Past Surgical History:   Procedure Laterality Date    MYRINGOTOMY W/ TUBES      UMBILICAL HERNIA REPAIR         Current Outpatient Medications   Medication Sig Dispense Refill    albuterol (2 5 mg/3 mL) 0 083 % nebulizer solution Take 1 vial (2 5 mg total) by nebulization every 6 (six) hours as needed for wheezing or shortness of breath 60 vial 0    fluticasone-salmeterol (Advair HFA) 45-21 MCG/ACT inhaler Inhale 2 puffs 2 (two) times a day Rinse mouth after use  1 Inhaler 0     No current facility-administered medications for this visit  No Known Allergies    Review of Systems   Constitutional: Negative for chills, fatigue, fever and weight loss  HENT: Positive for congestion, rhinorrhea, sinus pressure and sore throat  Negative for ear pain and postnasal drip  Respiratory: Positive for cough  Negative for hemoptysis, chest tightness, shortness of breath and wheezing  Cardiovascular: Negative  Negative for chest pain  Gastrointestinal: Negative  Negative for heartburn  Musculoskeletal: Negative for myalgias  Skin: Negative for rash  Allergic/Immunologic: Negative for environmental allergies  Neurological: Negative for headaches  Video Exam    There were no vitals filed for this visit  Physical Exam  Constitutional:       Appearance: He is not ill-appearing  Pulmonary:      Effort: Pulmonary effort is normal  No respiratory distress  Neurological:      Mental Status: He is alert and oriented to person, place, and time  I spent 5 minutes directly with the patient during this visit      VIRTUAL VISIT [address] acknowledges that he has consented to an online visit or consultation  He understands that the online visit is based solely on information provided by him, and that, in the absence of a face-to-face physical evaluation by the physician, the diagnosis he receives is both limited and provisional in terms of accuracy and completeness  This is not intended to replace a full medical face-to-face evaluation by the physician  Alexx Reynaga understands and accepts these terms

## 2021-07-02 ENCOUNTER — TELEPHONE (OUTPATIENT)
Dept: FAMILY MEDICINE CLINIC | Facility: CLINIC | Age: 26
End: 2021-07-02

## 2021-07-02 NOTE — TELEPHONE ENCOUNTER
----- Message from Magui Redman MD sent at 7/2/2021  3:57 PM EDT -----  Pl, advise pt-  negative COVID test -  Frank Stroud was sent

## 2023-10-08 NOTE — MISCELLANEOUS
Provider Comments  Provider Comments:   [facility]  PT CALLED TO SET UP APPT  LEFT MESSAGE   CMD      Signatures   Electronically signed by : PARDEEP Moon;  Apr 5 2017  6:30PM EST                       (Author) TELEMETRY

## 2023-11-10 ENCOUNTER — OFFICE VISIT (OUTPATIENT)
Dept: URGENT CARE | Facility: CLINIC | Age: 28
End: 2023-11-10
Payer: COMMERCIAL

## 2023-11-10 VITALS
TEMPERATURE: 96.5 F | BODY MASS INDEX: 23.28 KG/M2 | SYSTOLIC BLOOD PRESSURE: 110 MMHG | RESPIRATION RATE: 18 BRPM | OXYGEN SATURATION: 98 % | WEIGHT: 153.6 LBS | HEART RATE: 75 BPM | HEIGHT: 68 IN | DIASTOLIC BLOOD PRESSURE: 70 MMHG

## 2023-11-10 DIAGNOSIS — J06.9 VIRAL URI WITH COUGH: Primary | ICD-10-CM

## 2023-11-10 PROCEDURE — 99213 OFFICE O/P EST LOW 20 MIN: CPT | Performed by: FAMILY MEDICINE

## 2023-11-10 RX ORDER — PREDNISONE 20 MG/1
20 TABLET ORAL EVERY MORNING
Qty: 5 TABLET | Refills: 0 | Status: SHIPPED | OUTPATIENT
Start: 2023-11-10 | End: 2023-11-15

## 2023-11-10 RX ORDER — BENZONATATE 200 MG/1
200 CAPSULE ORAL 3 TIMES DAILY PRN
Qty: 20 CAPSULE | Refills: 0 | Status: SHIPPED | OUTPATIENT
Start: 2023-11-10

## 2023-11-10 RX ORDER — ALBUTEROL SULFATE 90 UG/1
2 AEROSOL, METERED RESPIRATORY (INHALATION) EVERY 4 HOURS PRN
COMMUNITY

## 2023-11-10 RX ORDER — BUSPIRONE HYDROCHLORIDE 5 MG/1
5 TABLET ORAL 2 TIMES DAILY PRN
COMMUNITY
Start: 2023-10-07

## 2023-11-10 NOTE — PROGRESS NOTES
[facility]        NAME: Lolly Callejas is a 32 y.o. male  : 1995    MRN: [id number]  DATE: November 10, 2023  TIME: 5:52 PM    Assessment and Plan   Viral URI with cough [J06.9]  1. Viral URI with cough  predniSONE 20 mg tablet    benzonatate (TESSALON) 200 MG capsule        Likely experiencing a viral respiratory tract infection with increased bronchial mucus production along with postnasal drip. As such, we will treat with 5 days of a steroid and as needed Tessalon Perles for cough suppression. Patient advised on hydrating with plenty fluids and to consider taking a daily antihistamine such as Zyrtec, Claritin or Allegra for the next 1 to 2 weeks to remove seasonal allergies as a contributing factor. Patient Instructions     Follow up with PCP in 3-5 days. Proceed to  ER if symptoms worsen. Chief Complaint     Chief Complaint   Patient presents with    Cold Like Symptoms    Cough    Wheezing     X 9-10 days - harsh, congested cough with wheeze, nasal congestion/PND and R ear pain. Had fever 4 days ago. Taking Mucinex DM and alb HFA BID and nebulizer BID and started Advair 1 week ago. History of Present Illness       75-year-old male with pertinent history of asthma presents today with 9 to 10 days of URI symptoms including coughing associated with chest congestion, nasal congestion, postnasal drip, right otalgia and some wheezing. Did have a fever of 101 degrees about 4 days ago and was tested for COVID and found to be negative. Is currently taking Mucinex DM, Advair and albuterol nebulizer twice daily. Review of Systems   Review of Systems   Constitutional:  Positive for fever (101). Negative for chills. HENT:  Positive for congestion, ear pain (right), postnasal drip and rhinorrhea. Negative for sinus pressure, sore throat and trouble swallowing. Respiratory:  Positive for cough, chest tightness and wheezing. Negative for shortness of breath.     Cardiovascular: Negative for chest pain. Gastrointestinal:  Negative for abdominal pain and nausea. Musculoskeletal:  Negative for arthralgias. Skin:  Negative for rash. Allergic/Immunologic: Positive for environmental allergies. Neurological:  Positive for headaches. Negative for dizziness. Current Medications       Current Outpatient Medications:     albuterol (2.5 mg/3 mL) 0.083 % nebulizer solution, Take 1 vial (2.5 mg total) by nebulization every 6 (six) hours as needed for wheezing or shortness of breath, Disp: 60 vial, Rfl: 0    albuterol (PROVENTIL HFA,VENTOLIN HFA) 90 mcg/act inhaler, Inhale 2 puffs every 4 (four) hours as needed for wheezing, Disp: , Rfl:     benzonatate (TESSALON) 200 MG capsule, Take 1 capsule (200 mg total) by mouth 3 (three) times a day as needed for cough, Disp: 20 capsule, Rfl: 0    busPIRone (BUSPAR) 5 mg tablet, Take 5 mg by mouth 2 (two) times a day as needed, Disp: , Rfl:     fluticasone-salmeterol (Advair HFA) 45-21 MCG/ACT inhaler, Inhale 2 puffs 2 (two) times a day Rinse mouth after use.  (Patient taking differently: Inhale 2 puffs 2 (two) times a day as needed Rinse mouth after use.), Disp: 1 Inhaler, Rfl: 0    predniSONE 20 mg tablet, Take 1 tablet (20 mg total) by mouth every morning for 5 days, Disp: 5 tablet, Rfl: 0    Current Allergies     Allergies as of 11/10/2023    (No Known Allergies)            The following portions of the patient's history were reviewed and updated as appropriate: allergies, current medications, past family history, past medical history, past social history, past surgical history and problem list.     Past Medical History:   Diagnosis Date    Acne     Resolved 4/26/2017     Allergic rhinitis     Last assessed 3/20/2013     Asthma     Dehydration     Onset date: 2/10/2012     Exercise induced bronchospasm     Onset date: 6/1/2009    Herpes simplex     Onset date: 2/11/2012     History of oral aphthous ulcers     Onset date: 2/12/2012     Nevus, atypical     Resolved 4/26/2017        Past Surgical History:   Procedure Laterality Date    MYRINGOTOMY W/ TUBES      UMBILICAL HERNIA REPAIR         Family History   Problem Relation Age of Onset    No Known Problems Mother     No Known Problems Father          Medications have been verified. Objective   /70   Pulse 75   Temp (!) 96.5 °F (35.8 °C)   Resp 18   Ht 5' 8" (1.727 m)   Wt 69.7 kg (153 lb 9.6 oz)   SpO2 98%   BMI 23.35 kg/m²   No LMP for male patient. Physical Exam     Physical Exam  Vitals and nursing note reviewed. Constitutional:       General: He is in acute distress. Appearance: Normal appearance. He is normal weight. He is not ill-appearing, toxic-appearing or diaphoretic. HENT:      Head: Normocephalic and atraumatic. Nose:      Comments: Inflamed nasal mucosa     Mouth/Throat:      Mouth: Mucous membranes are moist.      Pharynx: No posterior oropharyngeal erythema. Eyes:      General:         Right eye: No discharge. Left eye: No discharge. Conjunctiva/sclera: Conjunctivae normal.   Cardiovascular:      Rate and Rhythm: Normal rate and regular rhythm. Heart sounds: Normal heart sounds. Pulmonary:      Effort: Pulmonary effort is normal. No respiratory distress. Breath sounds: Normal breath sounds. No wheezing, rhonchi or rales. Skin:     General: Skin is warm. Findings: No erythema. Neurological:      General: No focal deficit present. Mental Status: He is alert and oriented to person, place, and time. Psychiatric:         Mood and Affect: Mood normal.         Behavior: Behavior normal.         Thought Content:  Thought content normal.         Judgment: Judgment normal.

## 2024-07-25 ENCOUNTER — OFFICE VISIT (OUTPATIENT)
Dept: URGENT CARE | Facility: CLINIC | Age: 29
End: 2024-07-25

## 2024-07-25 VITALS
HEART RATE: 70 BPM | WEIGHT: 159 LBS | BODY MASS INDEX: 24.1 KG/M2 | HEIGHT: 68 IN | RESPIRATION RATE: 18 BRPM | DIASTOLIC BLOOD PRESSURE: 75 MMHG | TEMPERATURE: 98.4 F | OXYGEN SATURATION: 96 % | SYSTOLIC BLOOD PRESSURE: 122 MMHG

## 2024-07-25 DIAGNOSIS — B34.9 VIRAL SYNDROME: Primary | ICD-10-CM

## 2024-07-25 RX ORDER — PREDNISONE 10 MG/1
30 TABLET ORAL DAILY
Qty: 9 TABLET | Refills: 0 | Status: SHIPPED | OUTPATIENT
Start: 2024-07-25 | End: 2024-07-28

## 2024-07-25 RX ORDER — LIDOCAINE HYDROCHLORIDE 20 MG/ML
15 SOLUTION OROPHARYNGEAL 4 TIMES DAILY PRN
Qty: 120 ML | Refills: 0 | Status: SHIPPED | OUTPATIENT
Start: 2024-07-25

## 2024-07-25 RX ORDER — BROMPHENIRAMINE MALEATE, PSEUDOEPHEDRINE HYDROCHLORIDE, AND DEXTROMETHORPHAN HYDROBROMIDE 2; 30; 10 MG/5ML; MG/5ML; MG/5ML
10 SYRUP ORAL 4 TIMES DAILY PRN
Qty: 120 ML | Refills: 0 | Status: SHIPPED | OUTPATIENT
Start: 2024-07-25

## 2024-07-25 NOTE — PATIENT INSTRUCTIONS
"Patient Education     Cough, runny nose, and the common cold   The Basics   Written by the doctors and editors at [facility]   What causes cough, runny nose, and other symptoms of the common cold? -- These symptoms are usually caused by a virus. Doctors also use the term \"viral upper respiratory infection\" or \"viral URI.\" Lots of different viruses can get into your nose, mouth, throat, or airways and cause cold symptoms.  Most people get better from a cold without any lasting problems. Even so, having a cold can be uncomfortable.  What are the symptoms of the common cold? -- Symptoms can include:   Sneezing   Coughing   Sniffling and runny nose   Sore throat   Chest congestion  In children, the common cold can also cause a fever. But adults do not usually get a fever when they have a cold.  Colds usually last about 3 to 7 days in adults and 10 days in children. But some people have symptoms for up to 2 weeks.  How can I tell if I have a cold or something else? -- Sometimes, it can be hard to tell if you have a cold or something else. Some cold symptoms can also be caused by other illnesses, such as COVID-19, the flu, or strep throat.  There are sometimes clues that can help you tell the difference:   COVID-19 often starts out very similar to a cold, although it can also cause a fever. If you have cold symptoms and have been around someone with COVID-19, you should get a test to find out if you have it, too.   The flu is more likely to cause fever, body aches, and extreme tiredness than a cold.   Strep throat usually causes severe throat pain. It can also cause a fever and swollen glands in the neck. People with strep throat usually do not have other cold symptoms like a stuffy nose or cough.  If you think that you might have an illness other than the common cold, call your doctor or nurse. They can tell you what to do.  Can medicine help with a cold? -- Usually, a cold gets better on its own and does not need " treatment. Because colds are usually caused by viruses, antibiotics will not help.  If you are a teen or an adult, you can try cough and cold medicines that you can get without a prescription. These medicines might help with your symptoms. But they can't cure your cold, or help you get well faster.  If you decide to try non-prescription cold medicines:   Read the directions on the label, and follow them carefully.   Do not combine 2 or more medicines that have acetaminophen in them. If you take too much acetaminophen, it can damage your liver.   If you have a heart condition, have high blood pressure, or take any prescription medicines, talk to your doctor or pharmacist before taking cold medicine. They can tell you which medicines are safe.  Some medicines are not safe for children:   If your child is younger than 6, do not give them any cold medicines. These medicines are not safe for young children. Even if your child is older than 6, cough and cold medicines are unlikely to help.   Never give aspirin to any child younger than 18 years old. In children, aspirin can cause a life-threatening condition called Reye syndrome.   When giving your child acetaminophen or other non-prescription medicines, never give more than the recommended dose.  Is there anything I can do on my own to feel better? -- Yes. You can:   Get plenty of rest.   Drink lots of fluids (water, juice, or broth) to stay hydrated. This will help replace any fluids lost if you have a runny nose or sweating from a fever. Warm tea or soup can help soothe a sore throat.   If the air in your home feels dry, use a cool-mist humidifier. This can help a stuffy nose and make it easier to breathe.   Use saline nose drops or spray to relieve stuffiness.   Avoid smoking, and stay away from places where people are smoking.  Can the common cold lead to more serious problems? -- In some cases, yes. In some people, having a cold can lead to:   Ear infections   Worse  asthma symptoms   Sinus infections   Pneumonia or bronchitis (infections of the lungs)  Can colds be prevented? -- There are some things you can do to keep germs from spreading:   Wash your hands with soap and water often (figure 1) - This can also help prevent the spread of other illnesses like the flu and COVID-19.   Cover your cough - Cough into your elbow instead of your hands. Teach children to do this, too. Throw away used tissues right away.   Clean surfaces - The germs that cause the common cold can live on tables, door handles, and other surfaces for at least 2 hours.   Stay home if you are sick - When you do need to be around other people, consider wearing a face mask until you are feeling better.  When should I call the doctor? -- Contact your doctor or nurse if you:   Lose your sense of taste or smell   Have a fever of more than 100.4°F (38°C) that comes with shaking chills, loss of appetite, or trouble breathing   Have a very bad sore throat   Have a fever and also have lung disease, such as emphysema or asthma   Have a cough that lasts longer than 10 days or starts getting worse   Have chest pain when you cough or breathe deeply, have trouble breathing, or cough up blood  If you are older than 65, or if you have any chronic medical conditions such as diabetes, contact your doctor or nurse any time you get a long-lasting cough.  Take your child to the emergency department if they:   Become confused or stop responding to you   Have trouble breathing or have to work hard to breathe  Contact your child's doctor or nurse if the child:   Loses their sense of taste or smell or won't eat foods that they ate before   Has a very bad sore throat   Refuses to drink anything for a long time   Is younger than 4 months   Has a fever and is not acting like themselves   Has a cough that lasts for more than 2 weeks and is not getting any better or is getting worse   Has a stuffed or runny nose that gets worse or does  not get any better after 10 days   Has red eyes or yellow goop coming out of their eyes   Has ear pain, pulls at their ears, or shows other signs of having an ear infection  All topics are updated as new evidence becomes available and our peer review process is complete.  This topic retrieved from [employer] on: Feb 26, 2024.  Topic 64869 Version 30.0  Release: 32.2.4 - C32.56  © 2024 UpToDate, Inc. and/or its affiliates. All rights reserved.  figure 1: How to wash your hands     Wet your hands with clean water, and apply a small amount of soap. Lather and rub hands together for at least 20 seconds. Clean your wrists, palms, backs of your hands, between your fingers, tips of your fingers, thumbs, and under and around your nails. Rinse well, and dry your hands using a clean towel.  Graphic 540407 Version 7.0  Consumer Information Use and Disclaimer   Disclaimer: This generalized information is a limited summary of diagnosis, treatment, and/or medication information. It is not meant to be comprehensive and should be used as a tool to help the user understand and/or assess potential diagnostic and treatment options. It does NOT include all information about conditions, treatments, medications, side effects, or risks that may apply to a specific patient. It is not intended to be medical advice or a substitute for the medical advice, diagnosis, or treatment of a health care provider based on the health care provider's examination and assessment of a patient's specific and unique circumstances. Patients must speak with a health care provider for complete information about their health, medical questions, and treatment options, including any risks or benefits regarding use of medications. This information does not endorse any treatments or medications as safe, effective, or approved for treating a specific patient. UpToDate, Inc. and its affiliates disclaim any warranty or liability relating to this information or the use  thereof.The use of this information is governed by the Terms of Use, available at https://www.wolterskluwer.com/en/know/clinical-effectiveness-terms. 2024© UpToDate, Inc. and its affiliates and/or licensors. All rights reserved.  Copyright   © 2024 "[employer] Inc. and/or its affiliates. All rights reserved.

## 2024-07-25 NOTE — PROGRESS NOTES
"  [facility]        NAME: Bryan Dao is a 28 y.o. male  : 1995    MRN: [id number]  DATE: 2024  TIME: 5:44 PM    Assessment and Plan   Viral syndrome [B34.9]  1. Viral syndrome  predniSONE 10 mg tablet    Lidocaine Viscous HCl (XYLOCAINE) 2 % mucosal solution    brompheniramine-pseudoephedrine-DM 30-2-10 MG/5ML syrup            Patient Instructions     Patient Instructions   Patient Education     Cough, runny nose, and the common cold   The Basics   Written by the doctors and editors at [facility]   What causes cough, runny nose, and other symptoms of the common cold? -- These symptoms are usually caused by a virus. Doctors also use the term \"viral upper respiratory infection\" or \"viral URI.\" Lots of different viruses can get into your nose, mouth, throat, or airways and cause cold symptoms.  Most people get better from a cold without any lasting problems. Even so, having a cold can be uncomfortable.  What are the symptoms of the common cold? -- Symptoms can include:   Sneezing   Coughing   Sniffling and runny nose   Sore throat   Chest congestion  In children, the common cold can also cause a fever. But adults do not usually get a fever when they have a cold.  Colds usually last about 3 to 7 days in adults and 10 days in children. But some people have symptoms for up to 2 weeks.  How can I tell if I have a cold or something else? -- Sometimes, it can be hard to tell if you have a cold or something else. Some cold symptoms can also be caused by other illnesses, such as COVID-19, the flu, or strep throat.  There are sometimes clues that can help you tell the difference:   COVID-19 often starts out very similar to a cold, although it can also cause a fever. If you have cold symptoms and have been around someone with COVID-19, you should get a test to find out if you have it, too.   The flu is more likely to cause fever, body aches, and extreme tiredness than a cold.   Strep throat usually " causes severe throat pain. It can also cause a fever and swollen glands in the neck. People with strep throat usually do not have other cold symptoms like a stuffy nose or cough.  If you think that you might have an illness other than the common cold, call your doctor or nurse. They can tell you what to do.  Can medicine help with a cold? -- Usually, a cold gets better on its own and does not need treatment. Because colds are usually caused by viruses, antibiotics will not help.  If you are a teen or an adult, you can try cough and cold medicines that you can get without a prescription. These medicines might help with your symptoms. But they can't cure your cold, or help you get well faster.  If you decide to try non-prescription cold medicines:   Read the directions on the label, and follow them carefully.   Do not combine 2 or more medicines that have acetaminophen in them. If you take too much acetaminophen, it can damage your liver.   If you have a heart condition, have high blood pressure, or take any prescription medicines, talk to your doctor or pharmacist before taking cold medicine. They can tell you which medicines are safe.  Some medicines are not safe for children:   If your child is younger than 6, do not give them any cold medicines. These medicines are not safe for young children. Even if your child is older than 6, cough and cold medicines are unlikely to help.   Never give aspirin to any child younger than 18 years old. In children, aspirin can cause a life-threatening condition called Reye syndrome.   When giving your child acetaminophen or other non-prescription medicines, never give more than the recommended dose.  Is there anything I can do on my own to feel better? -- Yes. You can:   Get plenty of rest.   Drink lots of fluids (water, juice, or broth) to stay hydrated. This will help replace any fluids lost if you have a runny nose or sweating from a fever. Warm tea or soup can help soothe a  sore throat.   If the air in your home feels dry, use a cool-mist humidifier. This can help a stuffy nose and make it easier to breathe.   Use saline nose drops or spray to relieve stuffiness.   Avoid smoking, and stay away from places where people are smoking.  Can the common cold lead to more serious problems? -- In some cases, yes. In some people, having a cold can lead to:   Ear infections   Worse asthma symptoms   Sinus infections   Pneumonia or bronchitis (infections of the lungs)  Can colds be prevented? -- There are some things you can do to keep germs from spreading:   Wash your hands with soap and water often (figure 1) - This can also help prevent the spread of other illnesses like the flu and COVID-19.   Cover your cough - Cough into your elbow instead of your hands. Teach children to do this, too. Throw away used tissues right away.   Clean surfaces - The germs that cause the common cold can live on tables, door handles, and other surfaces for at least 2 hours.   Stay home if you are sick - When you do need to be around other people, consider wearing a face mask until you are feeling better.  When should I call the doctor? -- Contact your doctor or nurse if you:   Lose your sense of taste or smell   Have a fever of more than 100.4°F (38°C) that comes with shaking chills, loss of appetite, or trouble breathing   Have a very bad sore throat   Have a fever and also have lung disease, such as emphysema or asthma   Have a cough that lasts longer than 10 days or starts getting worse   Have chest pain when you cough or breathe deeply, have trouble breathing, or cough up blood  If you are older than 65, or if you have any chronic medical conditions such as diabetes, contact your doctor or nurse any time you get a long-lasting cough.  Take your child to the emergency department if they:   Become confused or stop responding to you   Have trouble breathing or have to work hard to breathe  Contact your child's  doctor or nurse if the child:   Loses their sense of taste or smell or won't eat foods that they ate before   Has a very bad sore throat   Refuses to drink anything for a long time   Is younger than 4 months   Has a fever and is not acting like themselves   Has a cough that lasts for more than 2 weeks and is not getting any better or is getting worse   Has a stuffed or runny nose that gets worse or does not get any better after 10 days   Has red eyes or yellow goop coming out of their eyes   Has ear pain, pulls at their ears, or shows other signs of having an ear infection  All topics are updated as new evidence becomes available and our peer review process is complete.  This topic retrieved from [employer] on: Feb 26, 2024.  Topic 38357 Version 30.0  Release: 32.2.4 - C32.56  © 2024 UpToDate, Inc. and/or its affiliates. All rights reserved.  figure 1: How to wash your hands     Wet your hands with clean water, and apply a small amount of soap. Lather and rub hands together for at least 20 seconds. Clean your wrists, palms, backs of your hands, between your fingers, tips of your fingers, thumbs, and under and around your nails. Rinse well, and dry your hands using a clean towel.  Graphic 091014 Version 7.0  Consumer Information Use and Disclaimer   Disclaimer: This generalized information is a limited summary of diagnosis, treatment, and/or medication information. It is not meant to be comprehensive and should be used as a tool to help the user understand and/or assess potential diagnostic and treatment options. It does NOT include all information about conditions, treatments, medications, side effects, or risks that may apply to a specific patient. It is not intended to be medical advice or a substitute for the medical advice, diagnosis, or treatment of a health care provider based on the health care provider's examination and assessment of a patient's specific and unique circumstances. Patients must speak with a  health care provider for complete information about their health, medical questions, and treatment options, including any risks or benefits regarding use of medications. This information does not endorse any treatments or medications as safe, effective, or approved for treating a specific patient. UpToDate, Inc. and its affiliates disclaim any warranty or liability relating to this information or the use thereof.The use of this information is governed by the Terms of Use, available at https://www.Knewbi.comer.com/en/know/clinical-effectiveness-terms. 2024© UpToDate, Inc. and its affiliates and/or licensors. All rights reserved.  Copyright   © 2024 UpToDate, Inc. and/or its affiliates. All rights reserved.        Follow up with PCP in 3-5 days.  Proceed to  ER if symptoms worsen.    Chief Complaint     Chief Complaint   Patient presents with    Cold Like Symptoms     C/O of scratchy throat, body aches, sinus pressure and headache since Sunday  night.. Took Advil and Tylenol. Covid test done at home Result Negative.         History of Present Illness       The patient is a 28-year-old male with past medical history of asthma taking inhalers and BuSpar presenting today for 4 days of sore throat, body aches, sinus pressure and pain and a headache.  Took Advil and Tylenol.  2 COVID tests at home were negative.        Review of Systems   Review of Systems   Constitutional:  Positive for chills, diaphoresis and fatigue. Negative for activity change, appetite change and fever.   HENT:  Positive for sinus pressure, sinus pain and sore throat. Negative for congestion, ear pain and rhinorrhea.    Eyes:  Negative for pain and visual disturbance.   Respiratory:  Negative for cough, choking, chest tightness and shortness of breath.    Cardiovascular:  Negative for chest pain and palpitations.   Gastrointestinal:  Negative for abdominal pain, diarrhea, nausea and vomiting.   Genitourinary:  Negative for dysuria and hematuria.    Musculoskeletal:  Positive for myalgias. Negative for arthralgias and back pain.   Skin:  Negative for color change, pallor and rash.   Neurological:  Positive for headaches. Negative for seizures and syncope.   All other systems reviewed and are negative.        Current Medications       Current Outpatient Medications:     albuterol (2.5 mg/3 mL) 0.083 % nebulizer solution, Take 1 vial (2.5 mg total) by nebulization every 6 (six) hours as needed for wheezing or shortness of breath, Disp: 60 vial, Rfl: 0    albuterol (PROVENTIL HFA,VENTOLIN HFA) 90 mcg/act inhaler, Inhale 2 puffs every 4 (four) hours as needed for wheezing, Disp: , Rfl:     brompheniramine-pseudoephedrine-DM 30-2-10 MG/5ML syrup, Take 10 mL by mouth 4 (four) times a day as needed for cough or congestion, Disp: 120 mL, Rfl: 0    busPIRone (BUSPAR) 5 mg tablet, Take 5 mg by mouth 2 (two) times a day as needed, Disp: , Rfl:     fluticasone-salmeterol (Advair HFA) 45-21 MCG/ACT inhaler, Inhale 2 puffs 2 (two) times a day Rinse mouth after use. (Patient taking differently: Inhale 2 puffs 2 (two) times a day as needed Rinse mouth after use.), Disp: 1 Inhaler, Rfl: 0    Lidocaine Viscous HCl (XYLOCAINE) 2 % mucosal solution, Swish and spit 15 mL 4 (four) times a day as needed for mouth pain or discomfort, Disp: 120 mL, Rfl: 0    predniSONE 10 mg tablet, Take 3 tablets (30 mg total) by mouth daily for 3 days, Disp: 9 tablet, Rfl: 0    benzonatate (TESSALON) 200 MG capsule, Take 1 capsule (200 mg total) by mouth 3 (three) times a day as needed for cough (Patient not taking: Reported on 7/25/2024), Disp: 20 capsule, Rfl: 0    Current Allergies     Allergies as of 07/25/2024    (No Known Allergies)            The following portions of the patient's history were reviewed and updated as appropriate: allergies, current medications, past family history, past medical history, past social history, past surgical history and problem list.     Past Medical History:  "  Diagnosis Date    Acne     Resolved 4/26/2017     Allergic rhinitis     Last assessed 3/20/2013     Asthma     Dehydration     Onset date: 2/10/2012     Exercise induced bronchospasm     Onset date: 6/1/2009    Herpes simplex     Onset date: 2/11/2012     History of oral aphthous ulcers     Onset date: 2/12/2012     Nevus, atypical     Resolved 4/26/2017        Past Surgical History:   Procedure Laterality Date    MYRINGOTOMY W/ TUBES      UMBILICAL HERNIA REPAIR         Family History   Problem Relation Age of Onset    No Known Problems Mother     No Known Problems Father          Medications have been verified.        Objective   /75   Pulse 70   Temp 98.4 °F (36.9 °C)   Resp 18   Ht 5' 8\" (1.727 m)   Wt 72.1 kg (159 lb)   SpO2 96%   BMI 24.18 kg/m²        Physical Exam     Physical Exam  Vitals and nursing note reviewed.   Constitutional:       General: He is not in acute distress.     Appearance: Normal appearance. He is normal weight. He is not ill-appearing, toxic-appearing or diaphoretic.   HENT:      Head: Normocephalic and atraumatic.      Right Ear: Tympanic membrane, ear canal and external ear normal. There is no impacted cerumen.      Left Ear: Tympanic membrane, ear canal and external ear normal. There is no impacted cerumen.      Nose: Nose normal. No congestion or rhinorrhea.      Mouth/Throat:      Mouth: Mucous membranes are moist.      Pharynx: Oropharynx is clear. Posterior oropharyngeal erythema present. No oropharyngeal exudate.   Cardiovascular:      Rate and Rhythm: Normal rate and regular rhythm.      Heart sounds: Normal heart sounds. No murmur heard.     No friction rub. No gallop.   Pulmonary:      Effort: Pulmonary effort is normal. No respiratory distress.      Breath sounds: Normal breath sounds. No stridor. No wheezing, rhonchi or rales.   Chest:      Chest wall: No tenderness.   Abdominal:      General: Abdomen is flat. Bowel sounds are normal. There is no distension. "      Palpations: Abdomen is soft. There is no mass.      Tenderness: There is no abdominal tenderness. There is no guarding or rebound.      Hernia: No hernia is present.   Musculoskeletal:      Cervical back: Normal range of motion.   Lymphadenopathy:      Cervical: No cervical adenopathy.   Skin:     General: Skin is warm and dry.      Capillary Refill: Capillary refill takes less than 2 seconds.   Neurological:      Mental Status: He is alert.